# Patient Record
Sex: FEMALE | Race: BLACK OR AFRICAN AMERICAN | Employment: FULL TIME | URBAN - METROPOLITAN AREA
[De-identification: names, ages, dates, MRNs, and addresses within clinical notes are randomized per-mention and may not be internally consistent; named-entity substitution may affect disease eponyms.]

---

## 2017-10-12 ENCOUNTER — HOSPITAL ENCOUNTER (OUTPATIENT)
Dept: OTHER | Age: 37
Discharge: HOME | End: 2017-10-12
Attending: INTERNAL MEDICINE | Admitting: INTERNAL MEDICINE

## 2017-10-12 LAB — MAGNESIUM: 2 MG/DL (ref 1.8–2.4)

## 2017-11-16 ENCOUNTER — HOSPITAL ENCOUNTER (OUTPATIENT)
Dept: ENDOSCOPY | Age: 37
Discharge: HOME | End: 2017-11-16
Attending: INTERNAL MEDICINE | Admitting: INTERNAL MEDICINE

## 2017-11-16 LAB — HCG(URINE) PREGNANCY TEST: NEGATIVE

## 2017-11-16 NOTE — ANESTHESIA PRE-OP
2/25/16   Barbara Rodriguez, DAISY       Current medications:    Current Outpatient Prescriptions   Medication Sig Dispense Refill    ondansetron (ZOFRAN ODT) 4 MG disintegrating tablet Take 1-2 tablets by mouth every 12 hours as needed for Nausea May Sub regular tablet (non-ODT) if insurance does not cover ODT. 12 tablet 0    sucralfate (CARAFATE) 1 GM tablet Take 1 tablet by mouth 4 times daily 120 tablet 3    pantoprazole (PROTONIX) 40 MG tablet Take 40 mg by mouth daily      ondansetron (ZOFRAN ODT) 4 MG disintegrating tablet Take 1-2 tablets by mouth every 12 hours as needed for Nausea May Sub regular tablet (non-ODT) if insurance does not cover ODT. 12 tablet 0    diphenoxylate-atropine (LOMOTIL) 2.5-0.025 MG per tablet Take 1 tablet by mouth 4 times daily as needed for Diarrhea 12 tablet 0    lidocaine (LIDODERM) 5 % Place 1 patch onto the skin daily 12 hours on, 12 hours off. 10 patch 0    naproxen (NAPROSYN) 500 MG tablet Take 1 tablet by mouth 2 times daily for 20 doses 20 tablet 0    meclizine (ANTIVERT) 12.5 MG tablet Take 2 tablets by mouth 3 times daily as needed for Dizziness 20 tablet 0    loratadine-pseudoephedrine (CLARITIN-D 12 HOUR) 5-120 MG per extended release tablet Take 1 tablet by mouth 2 times daily 20 tablet 0    albuterol sulfate HFA (PROVENTIL HFA) 108 (90 BASE) MCG/ACT inhaler Inhale 2 puffs into the lungs every 4 hours as needed for Wheezing or Shortness of Breath (Space out to every 6 hours as symptoms improve) Space out to every 6 hours as symptoms improve. 1 Inhaler 0     No current facility-administered medications for this encounter. Allergies:     Allergies   Allergen Reactions    Cephalosporins Hives       Problem List:    Patient Active Problem List   Diagnosis Code    Perineal laceration of vagina S31.41XA    Quadriceps tendonitis M76.899       Past Medical History:        Diagnosis Date    Postpartum depression     Ulcer ([facility] [address]        Past Surgical History:

## 2017-11-16 NOTE — ANESTHESIA POST-OP
Anesthesia Post-op Note    Patient: Alex Licea  MRN: [de-identified]  YOB: 1980  Date of evaluation: 11/16/2017  Time:  9:03 AM     Procedure(s) Performed:     Last Vitals: There were no vitals taken for this visit.     Sterling Phase I:      Sterling Phase II:      Anesthesia Post Evaluation    Final anesthesia type: MAC and TIVA  Patient location during evaluation: PACU  Patient participation: complete - patient cannot participate  Pain score: 0  Airway patency: patent  Nausea & Vomiting: no vomiting and no nausea  Complications: no  Cardiovascular status: blood pressure returned to baseline  Respiratory status: acceptable  Hydration status: stable        Sylvia Lewis MD  9:03 AM

## 2017-12-12 ENCOUNTER — HOSPITAL ENCOUNTER (OUTPATIENT)
Dept: CT IMAGING | Age: 37
Discharge: HOME | End: 2017-12-12
Attending: INTERNAL MEDICINE | Admitting: INTERNAL MEDICINE

## 2017-12-12 DIAGNOSIS — R10.13 EPIGASTRIC PAIN: ICD-10-CM

## 2018-02-01 ENCOUNTER — HOSPITAL ENCOUNTER (OUTPATIENT)
Dept: ENDOSCOPY | Age: 38
Discharge: HOME | End: 2018-02-01
Attending: INTERNAL MEDICINE | Admitting: INTERNAL MEDICINE

## 2018-02-01 LAB — HCG(URINE) PREGNANCY TEST: NEGATIVE

## 2018-02-01 NOTE — ANESTHESIA PRE-OP
[facility] Anesthesiology  Pre-Anesthesia Evaluation/Consultation       Name:  Mary Bolanos                                         Age:  42 y.o. MRN:    [id number]           Procedure (Scheduled): COLONOSCOPY   Surgeon:     Dr. Lauretha Dubin, MD     Allergies   Allergen Reactions    Cephalosporins Hives     Patient Active Problem List   Diagnosis    Perineal laceration of vagina    Quadriceps tendonitis     Past Medical History:   Diagnosis Date    Postpartum depression     Ulcer [facility])      Past Surgical History:   Procedure Laterality Date    VAGINA SURGERY  3/28/2010    repair of vaginal tear     Social History   Substance Use Topics    Smoking status: Former Smoker     Packs/day: 0.50     Quit date: 1/18/2008    Smokeless tobacco: Not on file    Alcohol use Yes      Comment: 2 GLASSES OF WINE PER DAY       Prior to Admission medications    Not on File       No current outpatient prescriptions on file. No current facility-administered medications for this encounter. Vital Signs  (Current) There were no vitals filed for this visit.   (for past 48 hrs)  No Data Recorded  (last three values)   BP Readings from Last 3 Encounters:   09/14/17 126/78   06/25/17 114/75   06/08/17 117/67       CBC  Lab Results   Component Value Date    WBC 9.2 09/14/2017    RBC 4.20 09/14/2017    HGB 12.2 09/14/2017    HCT 36.6 09/14/2017    MCV 87.2 09/14/2017    RDW 12.9 09/14/2017     09/14/2017       CMP    Lab Results   Component Value Date     09/14/2017    K 3.5 09/14/2017     09/14/2017    CO2 21 09/14/2017    BUN 12 09/14/2017    CREATININE 0.6 09/14/2017    GFRAA >60 09/14/2017    GFRAA >60 04/14/2012    AGRATIO 1.2 06/25/2017    LABGLOM >60 09/14/2017    GLUCOSE 103 09/14/2017    PROT 7.3 09/14/2017    PROT 7.6 04/14/2012    CALCIUM 9.2 09/14/2017    BILITOT 0.3 09/14/2017    ALKPHOS 37 09/14/2017    AST 14 09/14/2017    ALT 11 09/14/2017       BMP    Lab Results   Component Value Date     09/14/2017    K 3.5 09/14/2017     09/14/2017    CO2 21 09/14/2017    BUN 12 09/14/2017    CREATININE 0.6 09/14/2017    CALCIUM 9.2 09/14/2017    GFRAA >60 09/14/2017    GFRAA >60 04/14/2012    LABGLOM >60 09/14/2017    GLUCOSE 103 09/14/2017       Coags   No results found for: PROTIME, INR, APTT    HCG (If Applicable)   Lab Results   Component Value Date    PREGTESTUR Negative 11/16/2017        ABGs  No results found for: PHART, PO2ART, PPD1QCX, KZS4UWY, BEART, T4QFFCGU     Type & Screen (If Applicable)  Lab Results   Component Value Date    LABABO A 03/28/2010    LABRH Positive 03/28/2010         POCGlucose  No results for input(s): GLUCOSE in the last 72 hours. NPO Status  > 8 hours                       BMI  There is no height or weight on file to calculate BMI. Estimated body mass index is 22.86 kg/m² as calculated from the following:    Height as of 1/18/18: 5' 2\" (1.575 m). Weight as of 1/18/18: 125 lb (56.7 kg). Additional Testing (Echo, Stress, ECG, PFTs, etc)        Anesthesia Evaluation  Patient summary reviewed and Nursing notes reviewed  Airway: Mallampati: II  TM distance: >3 FB   Neck ROM: full  Mouth opening: > = 3 FB Dental: normal exam         Pulmonary:normal exam        (-) COPD                           Cardiovascular:        (-) CAD    ECG reviewed  Rhythm: regular  Rate: normal           Beta Blocker:  Not on Beta Blocker         Neuro/Psych:      (-) CVA and psychiatric history           GI/Hepatic/Renal:             Endo/Other:                     Abdominal:           Vascular:                                        Anesthesia Plan      MAC     ASA 1     (Plan for MAC with standard ASA monitoring. Additional monitoring as dictated by intra-operative course. Patient appropriately NPO for the procedure. Risk/Benefits reviewed with patient and all anesthetic questions answered prior to procedure.  )  Induction: intravenous.       Anesthetic plan and risks

## 2019-01-16 ENCOUNTER — HOSPITAL ENCOUNTER (EMERGENCY)
Age: 39
Discharge: HOME | End: 2019-01-16
Payer: COMMERCIAL

## 2019-01-16 VITALS
HEART RATE: 88 BPM | DIASTOLIC BLOOD PRESSURE: 87 MMHG | RESPIRATION RATE: 18 BRPM | SYSTOLIC BLOOD PRESSURE: 135 MMHG | OXYGEN SATURATION: 100 % | TEMPERATURE: 97.7 F

## 2019-01-16 DIAGNOSIS — S46.811A TRAPEZIUS MUSCLE STRAIN, RIGHT, INITIAL ENCOUNTER: Primary | ICD-10-CM

## 2019-01-16 PROCEDURE — 99282 EMERGENCY DEPT VISIT SF MDM: CPT

## 2019-01-16 PROCEDURE — 6370000000 HC RX 637 (ALT 250 FOR IP): Performed by: PHYSICIAN ASSISTANT

## 2019-01-16 RX ORDER — HYDROCODONE BITARTRATE AND ACETAMINOPHEN 5; 325 MG/1; MG/1
1 TABLET ORAL EVERY 6 HOURS PRN
Qty: 12 TABLET | Refills: 0 | Status: SHIPPED | OUTPATIENT
Start: 2019-01-16 | End: 2019-01-19

## 2019-01-16 RX ORDER — IBUPROFEN 800 MG/1
800 TABLET ORAL ONCE
Status: COMPLETED | OUTPATIENT
Start: 2019-01-16 | End: 2019-01-16

## 2019-01-16 RX ORDER — LIDOCAINE 50 MG/G
1 PATCH TOPICAL DAILY
Qty: 5 PATCH | Refills: 0 | Status: SHIPPED | OUTPATIENT
Start: 2019-01-16 | End: 2019-02-15

## 2019-01-16 RX ORDER — LIDOCAINE 4 G/G
1 PATCH TOPICAL DAILY
Status: DISCONTINUED | OUTPATIENT
Start: 2019-01-16 | End: 2019-01-16 | Stop reason: HOSPADM

## 2019-01-16 RX ORDER — CYCLOBENZAPRINE HCL 10 MG
10 TABLET ORAL 3 TIMES DAILY PRN
Qty: 30 TABLET | Refills: 0 | Status: SHIPPED | OUTPATIENT
Start: 2019-01-16 | End: 2019-01-26

## 2019-01-16 RX ORDER — IBUPROFEN 800 MG/1
800 TABLET ORAL EVERY 8 HOURS PRN
Qty: 20 TABLET | Refills: 0 | Status: ON HOLD | OUTPATIENT
Start: 2019-01-16 | End: 2021-05-29 | Stop reason: SINTOL

## 2019-01-16 RX ADMIN — IBUPROFEN 800 MG: 800 TABLET, FILM COATED ORAL at 19:26

## 2019-01-16 ASSESSMENT — ENCOUNTER SYMPTOMS
SHORTNESS OF BREATH: 0
NAUSEA: 0
VOMITING: 0
COLOR CHANGE: 0
CONSTIPATION: 0
ABDOMINAL PAIN: 0
BACK PAIN: 1
DIARRHEA: 0

## 2019-01-16 ASSESSMENT — PAIN DESCRIPTION - PAIN TYPE: TYPE: ACUTE PAIN

## 2019-01-16 ASSESSMENT — PAIN SCALES - GENERAL
PAINLEVEL_OUTOF10: 10
PAINLEVEL_OUTOF10: 10

## 2019-01-21 ENCOUNTER — HOSPITAL ENCOUNTER (EMERGENCY)
Age: 39
Discharge: HOME | End: 2019-01-21
Attending: EMERGENCY MEDICINE
Payer: COMMERCIAL

## 2019-01-21 ENCOUNTER — APPOINTMENT (OUTPATIENT)
Dept: GENERAL RADIOLOGY | Age: 39
End: 2019-01-21
Payer: COMMERCIAL

## 2019-01-21 VITALS
SYSTOLIC BLOOD PRESSURE: 125 MMHG | OXYGEN SATURATION: 100 % | HEART RATE: 86 BPM | RESPIRATION RATE: 16 BRPM | DIASTOLIC BLOOD PRESSURE: 74 MMHG | TEMPERATURE: 97.2 F

## 2019-01-21 DIAGNOSIS — M54.6 PAIN IN THORACIC SPINE: Primary | ICD-10-CM

## 2019-01-21 PROCEDURE — 71046 X-RAY EXAM CHEST 2 VIEWS: CPT

## 2019-01-21 PROCEDURE — 99283 EMERGENCY DEPT VISIT LOW MDM: CPT

## 2019-01-21 PROCEDURE — 72040 X-RAY EXAM NECK SPINE 2-3 VW: CPT

## 2019-01-21 PROCEDURE — 6370000000 HC RX 637 (ALT 250 FOR IP): Performed by: EMERGENCY MEDICINE

## 2019-01-21 PROCEDURE — 96372 THER/PROPH/DIAG INJ SC/IM: CPT

## 2019-01-21 PROCEDURE — 6360000002 HC RX W HCPCS: Performed by: EMERGENCY MEDICINE

## 2019-01-21 RX ORDER — PREDNISONE 20 MG/1
60 TABLET ORAL ONCE
Status: COMPLETED | OUTPATIENT
Start: 2019-01-21 | End: 2019-01-21

## 2019-01-21 RX ORDER — METHOCARBAMOL 750 MG/1
750-1500 TABLET, FILM COATED ORAL 3 TIMES DAILY
Qty: 15 TABLET | Refills: 0 | Status: SHIPPED | OUTPATIENT
Start: 2019-01-21 | End: 2019-01-26

## 2019-01-21 RX ORDER — ORPHENADRINE CITRATE 30 MG/ML
60 INJECTION INTRAMUSCULAR; INTRAVENOUS ONCE
Status: COMPLETED | OUTPATIENT
Start: 2019-01-21 | End: 2019-01-21

## 2019-01-21 RX ORDER — PREDNISONE 20 MG/1
20 TABLET ORAL DAILY
Qty: 4 TABLET | Refills: 0 | Status: SHIPPED | OUTPATIENT
Start: 2019-01-21 | End: 2019-01-25

## 2019-01-21 RX ADMIN — ORPHENADRINE CITRATE 60 MG: 30 INJECTION INTRAMUSCULAR; INTRAVENOUS at 09:55

## 2019-01-21 RX ADMIN — PREDNISONE 60 MG: 20 TABLET ORAL at 09:55

## 2019-01-21 ASSESSMENT — PAIN DESCRIPTION - PAIN TYPE: TYPE: ACUTE PAIN

## 2019-01-21 ASSESSMENT — PAIN SCALES - GENERAL: PAINLEVEL_OUTOF10: 10

## 2020-02-26 ENCOUNTER — APPOINTMENT (OUTPATIENT)
Dept: GENERAL RADIOLOGY | Age: 40
End: 2020-02-26
Payer: COMMERCIAL

## 2020-02-26 ENCOUNTER — HOSPITAL ENCOUNTER (EMERGENCY)
Age: 40
Discharge: HOME | End: 2020-02-26
Attending: EMERGENCY MEDICINE
Payer: COMMERCIAL

## 2020-02-26 VITALS
TEMPERATURE: 97.6 F | DIASTOLIC BLOOD PRESSURE: 73 MMHG | BODY MASS INDEX: 21.9 KG/M2 | HEIGHT: 62 IN | WEIGHT: 119 LBS | RESPIRATION RATE: 16 BRPM | HEART RATE: 77 BPM | SYSTOLIC BLOOD PRESSURE: 108 MMHG | OXYGEN SATURATION: 98 %

## 2020-02-26 LAB
ANION GAP SERPL CALCULATED.3IONS-SCNC: 12 MMOL/L (ref 3–16)
BASOPHILS ABSOLUTE: 0.1 K/UL (ref 0–0.2)
BASOPHILS RELATIVE PERCENT: 0.9 %
BUN BLDV-MCNC: 12 MG/DL (ref 7–20)
CALCIUM SERPL-MCNC: 9.5 MG/DL (ref 8.3–10.6)
CHLORIDE BLD-SCNC: 104 MMOL/L (ref 99–110)
CO2: 22 MMOL/L (ref 21–32)
CREAT SERPL-MCNC: 0.6 MG/DL (ref 0.6–1.1)
EOSINOPHILS ABSOLUTE: 0.1 K/UL (ref 0–0.6)
EOSINOPHILS RELATIVE PERCENT: 1.1 %
GFR AFRICAN AMERICAN: >60
GFR NON-AFRICAN AMERICAN: >60
GLUCOSE BLD-MCNC: 116 MG/DL (ref 70–99)
HCG QUALITATIVE: NEGATIVE
HCT VFR BLD CALC: 39.2 % (ref 36–48)
HEMOGLOBIN: 13.5 G/DL (ref 12–16)
LYMPHOCYTES ABSOLUTE: 2 K/UL (ref 1–5.1)
LYMPHOCYTES RELATIVE PERCENT: 32.1 %
MCH RBC QN AUTO: 30.5 PG (ref 26–34)
MCHC RBC AUTO-ENTMCNC: 34.3 G/DL (ref 31–36)
MCV RBC AUTO: 88.8 FL (ref 80–100)
MONOCYTES ABSOLUTE: 0.5 K/UL (ref 0–1.3)
MONOCYTES RELATIVE PERCENT: 8.7 %
NEUTROPHILS ABSOLUTE: 3.6 K/UL (ref 1.7–7.7)
NEUTROPHILS RELATIVE PERCENT: 57.2 %
PDW BLD-RTO: 13.4 % (ref 12.4–15.4)
PLATELET # BLD: 280 K/UL (ref 135–450)
PMV BLD AUTO: 8.2 FL (ref 5–10.5)
POTASSIUM SERPL-SCNC: 3.8 MMOL/L (ref 3.5–5.1)
RBC # BLD: 4.42 M/UL (ref 4–5.2)
SODIUM BLD-SCNC: 138 MMOL/L (ref 136–145)
TROPONIN: <0.01 NG/ML
WBC # BLD: 6.3 K/UL (ref 4–11)

## 2020-02-26 PROCEDURE — 96374 THER/PROPH/DIAG INJ IV PUSH: CPT

## 2020-02-26 PROCEDURE — 99284 EMERGENCY DEPT VISIT MOD MDM: CPT

## 2020-02-26 PROCEDURE — 71046 X-RAY EXAM CHEST 2 VIEWS: CPT

## 2020-02-26 PROCEDURE — 6360000002 HC RX W HCPCS: Performed by: PHYSICIAN ASSISTANT

## 2020-02-26 PROCEDURE — 85025 COMPLETE CBC W/AUTO DIFF WBC: CPT

## 2020-02-26 PROCEDURE — 2580000003 HC RX 258: Performed by: PHYSICIAN ASSISTANT

## 2020-02-26 PROCEDURE — 84703 CHORIONIC GONADOTROPIN ASSAY: CPT

## 2020-02-26 PROCEDURE — 96375 TX/PRO/DX INJ NEW DRUG ADDON: CPT

## 2020-02-26 PROCEDURE — 80048 BASIC METABOLIC PNL TOTAL CA: CPT

## 2020-02-26 PROCEDURE — 84484 ASSAY OF TROPONIN QUANT: CPT

## 2020-02-26 PROCEDURE — 93005 ELECTROCARDIOGRAM TRACING: CPT | Performed by: EMERGENCY MEDICINE

## 2020-02-26 RX ORDER — HYDROXYZINE PAMOATE 25 MG/1
25 CAPSULE ORAL 3 TIMES DAILY PRN
Qty: 20 CAPSULE | Refills: 0 | Status: SHIPPED | OUTPATIENT
Start: 2020-02-26

## 2020-02-26 RX ORDER — DIPHENHYDRAMINE HYDROCHLORIDE 50 MG/ML
25 INJECTION INTRAMUSCULAR; INTRAVENOUS ONCE
Status: COMPLETED | OUTPATIENT
Start: 2020-02-26 | End: 2020-02-26

## 2020-02-26 RX ORDER — NAPROXEN 500 MG/1
500 TABLET ORAL 2 TIMES DAILY
Qty: 20 TABLET | Refills: 0 | Status: ON HOLD | OUTPATIENT
Start: 2020-02-26 | End: 2021-05-29

## 2020-02-26 RX ORDER — 0.9 % SODIUM CHLORIDE 0.9 %
1000 INTRAVENOUS SOLUTION INTRAVENOUS ONCE
Status: COMPLETED | OUTPATIENT
Start: 2020-02-26 | End: 2020-02-26

## 2020-02-26 RX ORDER — PROMETHAZINE HYDROCHLORIDE 25 MG/1
12.5-25 TABLET ORAL EVERY 6 HOURS PRN
Qty: 12 TABLET | Refills: 0 | Status: SHIPPED | OUTPATIENT
Start: 2020-02-26 | End: 2020-03-04

## 2020-02-26 RX ORDER — METOCLOPRAMIDE HYDROCHLORIDE 5 MG/ML
10 INJECTION INTRAMUSCULAR; INTRAVENOUS ONCE
Status: COMPLETED | OUTPATIENT
Start: 2020-02-26 | End: 2020-02-26

## 2020-02-26 RX ORDER — ESCITALOPRAM OXALATE 20 MG/1
TABLET ORAL
COMMUNITY
Start: 2020-02-12

## 2020-02-26 RX ORDER — KETOROLAC TROMETHAMINE 30 MG/ML
15 INJECTION, SOLUTION INTRAMUSCULAR; INTRAVENOUS ONCE
Status: COMPLETED | OUTPATIENT
Start: 2020-02-26 | End: 2020-02-26

## 2020-02-26 RX ADMIN — KETOROLAC TROMETHAMINE 15 MG: 30 INJECTION, SOLUTION INTRAMUSCULAR; INTRAVENOUS at 10:34

## 2020-02-26 RX ADMIN — METOCLOPRAMIDE 10 MG: 5 INJECTION, SOLUTION INTRAMUSCULAR; INTRAVENOUS at 10:34

## 2020-02-26 RX ADMIN — DIPHENHYDRAMINE HYDROCHLORIDE 25 MG: 50 INJECTION, SOLUTION INTRAMUSCULAR; INTRAVENOUS at 10:34

## 2020-02-26 RX ADMIN — SODIUM CHLORIDE 1000 ML: 9 INJECTION, SOLUTION INTRAVENOUS at 10:34

## 2020-02-26 ASSESSMENT — ENCOUNTER SYMPTOMS
WHEEZING: 0
BACK PAIN: 0
ABDOMINAL PAIN: 0
NAUSEA: 1
VOMITING: 0
EYE ITCHING: 0
SHORTNESS OF BREATH: 1
COUGH: 0
CONSTIPATION: 0
ABDOMINAL DISTENTION: 0
DIARRHEA: 0
EYE DISCHARGE: 0
COLOR CHANGE: 0
STRIDOR: 0
EYE PAIN: 0
EYE REDNESS: 0
PHOTOPHOBIA: 1

## 2020-02-26 ASSESSMENT — PAIN SCALES - GENERAL
PAINLEVEL_OUTOF10: 8
PAINLEVEL_OUTOF10: 9

## 2020-02-26 NOTE — ED PROVIDER NOTES
[facility]        Pt Name: Armen Cintron  MRN: [de-identified]  [place] 1980  Date of evaluation: 2/26/2020  Provider: Jatinder Stanley PA-C  PCP: AMY Dean - CNP    This patient was seen and evaluated by the attending physician Dr Tonia Sidhu       Chief Complaint   Patient presents with    Headache     past 3-4 days- no history of such- also with c/o sob and chest pain        HISTORY OF PRESENT ILLNESS   (Location, Timing/Onset, Context/Setting, Quality, Duration, Modifying Factors, Severity, Associated Signs and Symptoms)  Note limiting factors. Armen Cintron is a 37 y.o. female who presents to the emergency department with complaints of right-sided parietal headache on and off for 2 to 3 days. She also reports that since yesterday, she has had intermittent chest discomfort and shortness of breath, which she believes is likely related to anxiety. She does have history of anxiety and states that recently she found out her boyfriend was . She has not had much of an appetite since finding this out. Typically when she does not eat or drink much, she does get a mild headache but states that she does not get them regularly. She rates her headache to be a 9 out of 10 on pain scale. Denies any acute visual disturbances but does have photophobia. She denies chest pain or shortness of breath at this time. She does become very tearful but denies hallucinations, suicidal or homicidal ideation, illicit drug use or alcohol abuse. Nursing Notes were all reviewed and agreed with or any disagreements were addressed in the HPI. REVIEW OF SYSTEMS    (2-9 systems for level 4, 10 or more for level 5)     Review of Systems   Constitutional: Negative for chills and fever. HENT: Negative. Eyes: Positive for photophobia. Negative for pain, discharge, redness, itching and visual disturbance. Respiratory: Positive for shortness of breath. Negative for cough, wheezing and stridor. Cardiovascular: Positive for chest pain. Negative for palpitations and leg swelling. Gastrointestinal: Positive for nausea. Negative for abdominal distention, abdominal pain, constipation, diarrhea and vomiting. Endocrine: Negative. Genitourinary: Negative. Musculoskeletal: Negative for back pain, neck pain and neck stiffness. Skin: Negative for color change, pallor, rash and wound. Neurological: Positive for light-headedness and headaches. Negative for dizziness, tremors, seizures, syncope, facial asymmetry, speech difficulty, weakness and numbness. Psychiatric/Behavioral: Positive for sleep disturbance. Negative for confusion, self-injury and suicidal ideas. The patient is nervous/anxious. All other systems reviewed and are negative. Positives and Pertinent negatives as per HPI. Except as noted above in the ROS, all other systems were reviewed and negative.        PAST MEDICAL HISTORY     Past Medical History:   Diagnosis Date    Postpartum depression     Ulcer          SURGICAL HISTORY     Past Surgical History:   Procedure Laterality Date    VAGINA SURGERY  3/28/2010    repair of vaginal tear         CURRENTMEDICATIONS       Previous Medications    ESCITALOPRAM (LEXAPRO) 20 MG TABLET        FAMOTIDINE (PEPCID) 20 MG TABLET    Take 1 tablet by mouth 2 times daily for 5 days    IBUPROFEN (ADVIL;MOTRIN) 800 MG TABLET    Take 1 tablet by mouth every 8 hours as needed for Pain         ALLERGIES     Cephalosporins    FAMILYHISTORY       Family History   Problem Relation Age of Onset    Arthritis Mother     Arthritis Father     Asthma Father     Diabetes Father     High Blood Pressure Father     Cancer Maternal Aunt           SOCIAL HISTORY       Social History     Tobacco Use    Smoking status: Former Smoker     Packs/day: 0.50     Last attempt to quit: 1/18/2008     Years since quitting: 12.1    Smokeless tobacco: Never Used   Substance Use Topics    Alcohol use: Yes     Comment: 2 GLASSES OF WINE PER DAY    Drug use: No       SCREENINGS             PHYSICAL EXAM    (up to 7 for level 4, 8 or more for level 5)     ED Triage Vitals [20 0928]   BP Temp Temp Source Pulse Resp SpO2 Height Weight   114/75 97.6 °F (36.4 °C) Oral 89 12 99 % 5' 2\" (1.575 m) 119 lb (54 kg)       Physical Exam  Vitals signs and nursing note reviewed. Constitutional:       Appearance: Normal appearance. She is well-developed. She is not toxic-appearing or diaphoretic. HENT:      Head: Normocephalic and atraumatic. Jaw: There is normal jaw occlusion. Salivary Glands: Right salivary gland is not diffusely enlarged or tender. Left salivary gland is not diffusely enlarged or tender. Comments: No temporal tenderness or pulsatile mass     Right Ear: Hearing, tympanic membrane, ear canal and external ear normal.      Left Ear: Hearing, tympanic membrane, ear canal and external ear normal.      Nose: Nose normal.      Right Sinus: No maxillary sinus tenderness or frontal sinus tenderness. Left Sinus: No maxillary sinus tenderness or frontal sinus tenderness. Mouth/Throat:      Lips: Pink. Mouth: Mucous membranes are moist.      Dentition: No dental tenderness. Tongue: No lesions. Tongue does not protrude in midline. Palate: No mass and lesions. Palate does not elevate in midline. Pharynx: Oropharynx is clear. Uvula midline. Tonsils: No tonsillar exudate or tonsillar abscesses. Swellin+ on the right. 1+ on the left. Eyes:      General: No scleral icterus. Right eye: No discharge. Left eye: No discharge. Extraocular Movements: Extraocular movements intact. Conjunctiva/sclera: Conjunctivae normal.      Pupils: Pupils are equal, round, and reactive to light. Neck:      Musculoskeletal: Normal range of motion.    Cardiovascular:      Rate and Rhythm: Normal rate and regular rhythm. Heart sounds: Normal heart sounds. Pulmonary:      Effort: Pulmonary effort is normal.      Breath sounds: Normal breath sounds. Abdominal:      General: Bowel sounds are normal.      Palpations: Abdomen is soft. Tenderness: There is no abdominal tenderness. Musculoskeletal: Normal range of motion. Comments: No extremity edema, posterior calf or thigh tenderness, palpable cord, discoloration. Negative homans. Skin:     General: Skin is warm and dry. Capillary Refill: Capillary refill takes less than 2 seconds. Coloration: Skin is not jaundiced or pale. Findings: No bruising, erythema, lesion or rash. Neurological:      General: No focal deficit present. Mental Status: She is alert and oriented to person, place, and time. Cranial Nerves: No cranial nerve deficit. Sensory: No sensory deficit. Motor: No weakness. Coordination: Coordination normal.      Gait: Gait normal.      Deep Tendon Reflexes: Reflexes normal.      Comments: No pronator drift, facial droop or slurred speech. Normal finger to nose coordination. Normal rapid alternating hand movement. Normal heel to shin coordination   Andrey Hallpike negative without nystagmus. 5 out of 5 strength in all 4 extremities without focal weakness, paresthesia or radiculopathy. Psychiatric:         Attention and Perception: Attention and perception normal.         Mood and Affect: Mood is anxious and depressed. Speech: Speech normal.         Behavior: Behavior normal. Behavior is cooperative. Thought Content:  Thought content normal.         Cognition and Memory: Cognition and memory normal.         Judgment: Judgment normal.         DIAGNOSTIC RESULTS   LABS:    Labs Reviewed   BASIC METABOLIC PANEL - Abnormal; Notable for the following components:       Result Value    Glucose 116 (*)     All other components within normal limits    Narrative: Performed at:  [facility]  [address], [address]   Phone [phone]   TROPONIN    Narrative:     Performed at:  [facility]  [address], [address]   Phone [phone]   CBC WITH AUTO DIFFERENTIAL    Narrative:     Performed at:  [facility] [address] [facility],  [place], [address]   Phone [phone]   HCG, SERUM, QUALITATIVE    Narrative:     Performed at:  [facility] [address],  [place], [address]   Phone [phone]       All other labs were within normal range or not returned as of this dictation. EKG: All EKG's are interpreted by the Emergency Department Physician in the absence of a cardiologist.  Please see their note for interpretation of EKG. RADIOLOGY:   Non-plain film images such as CT, Ultrasound and MRI are read by the radiologist. Plain radiographic images are visualized and preliminarily interpreted by the ED Provider with the below findings:        Interpretation per the Radiologist below, if available at the time of this note:    XR CHEST STANDARD (2 VW)   Final Result   1. No acute abnormality. Xr Chest Standard (2 Vw)    Result Date: 2/26/2020  EXAMINATION: TWO XRAY VIEWS OF THE CHEST 2/26/2020 9:37 am COMPARISON: 01/21/2019 HISTORY: ORDERING SYSTEM PROVIDED HISTORY: cp TECHNOLOGIST PROVIDED HISTORY: Reason for exam:->cp Reason for Exam: Headache (past 3-4 days- no history of such- also with c/o sob and chest pain ) Acuity: Acute Type of Exam: Initial FINDINGS: The lungs are clear. The cardiac silhouette is within normal limits. There is no pneumothorax or pleural effusion. 1.  No acute abnormality.            PROCEDURES   Unless otherwise noted below, none     Procedures    CRITICAL CARE TIME   N/A    CONSULTS:  None      EMERGENCY DEPARTMENT COURSE and DIFFERENTIAL DIAGNOSIS/MDM:   Vitals: leukemia, asthma exacerbation, osteomyelitis, mastoiditis, sepsis, DKA, acute surgical abdomen, obstruction, perforation, abscess, mesenteric ischemia, AAA, dissection, cholecystitis, cholangitis, pancreatitis, appendicitis, C. diff colitis, diverticulitis, volvulus, incarcerated hernia, necrotizing fasciitis, TOA, ovarian torsion, PID, ectopic pregnancy, viviane jason Gregory syndrome, , pyelonephritis, perinephric abscess, kidney stone, urosepsis, fistula or other concerning pathology. Vitals are stable here, and patient is stable for discharge. We have addressed concerns and expectations. She denies suicidal or homicidal ideation or hallucinations. Denies illicit drug use or alcohol abuse. Suspicion is low for delirium tremens or overdose. FINAL IMPRESSION      1. Acute nonintractable headache, unspecified headache type    2. Chest discomfort    3. Shortness of breath    4.  Anxiety attack          DISPOSITION/PLAN   DISPOSITION Decision To Discharge 02/26/2020 10:52:21 AM      PATIENT REFERREDTO:  AMY Mcgill CNP  [address] [place] [address]  [phone]      for follow up in 1-3 days    [facility] Emergency Department  [address] [facility]  [phone]    If symptoms worsen      DISCHARGE MEDICATIONS:  New Prescriptions    HYDROXYZINE (VISTARIL) 25 MG CAPSULE    Take 1 capsule by mouth 3 times daily as needed for Anxiety    NAPROXEN (NAPROSYN) 500 MG TABLET    Take 1 tablet by mouth 2 times daily for 20 doses    PROMETHAZINE (PHENERGAN) 25 MG TABLET    Take 0.5-1 tablets by mouth every 6 hours as needed for Nausea       DISCONTINUED MEDICATIONS:  Discontinued Medications    No medications on file              (Please note that portions of this note were completed with a voice recognition program.  Efforts were made to edit the dictations but occasionally words are mis-transcribed.)    Mehdi Suarez PA-C (electronically signed) Lucinda Rich PA-C  02/26/20 1212

## 2020-02-26 NOTE — ED PROVIDER NOTES
This patient was seen by the Mid-Level Provider. I have seen and examined the patient, agree with the workup, evaluation, management and diagnosis. Care plan has been discussed. My assessment reveals a 58-year-old female presents with multiple complaints. This is a 58-year-old female who states she is currently going through a separation from a boyfriend she found out was recently . She presents today with a history of some chest pain or shortness of breath over the last 3 or 4 days. Patient's work-up today was unremarkable normal including a cardiac work-up and a chest x-ray. The patient was discharged with referral back to her primary care physician with instructed to return if worse. The patient is at low risk for cardiac disease. EKG: Sinus rhythm at a rate of 88 beats a minute with no acute ST elevations or depressions or pathologic Q waves. Normal axis. Exam: Well-nourished female no acute distress. Her chest was clear to auscultation bilaterally. Heart was regular rate rhythm with no murmurs rubs gallops. MDM: 58-year-old female presents with some chest pain or shortness of breath. Her work-up was unremarkable. She scores low on her heart score. This appears to be secondary to some stress going on with her private life. She was discharged with referral back to her primary care physician with instructed to return if worse.     Results for orders placed or performed during the hospital encounter of 03/01/51   Basic Metabolic Panel   Result Value Ref Range    Sodium 138 136 - 145 mmol/L    Potassium 3.8 3.5 - 5.1 mmol/L    Chloride 104 99 - 110 mmol/L    CO2 22 21 - 32 mmol/L    Anion Gap 12 3 - 16    Glucose 116 (H) 70 - 99 mg/dL    BUN 12 7 - 20 mg/dL    CREATININE 0.6 0.6 - 1.1 mg/dL    GFR Non-African American >60 >60    GFR African American >60 >60    Calcium 9.5 8.3 - 10.6 mg/dL   Troponin   Result Value Ref Range    Troponin <0.01 <0.01 ng/mL   CBC Auto Differential   Result Value Ref Range    WBC 6.3 4.0 - 11.0 K/uL    RBC 4.42 4.00 - 5.20 M/uL    Hemoglobin 13.5 12.0 - 16.0 g/dL    Hematocrit 39.2 36.0 - 48.0 %    MCV 88.8 80.0 - 100.0 fL    MCH 30.5 26.0 - 34.0 pg    MCHC 34.3 31.0 - 36.0 g/dL    RDW 13.4 12.4 - 15.4 %    Platelets 356 327 - 359 K/uL    MPV 8.2 5.0 - 10.5 fL    Neutrophils % 57.2 %    Lymphocytes % 32.1 %    Monocytes % 8.7 %    Eosinophils % 1.1 %    Basophils % 0.9 %    Neutrophils Absolute 3.6 1.7 - 7.7 K/uL    Lymphocytes Absolute 2.0 1.0 - 5.1 K/uL    Monocytes Absolute 0.5 0.0 - 1.3 K/uL    Eosinophils Absolute 0.1 0.0 - 0.6 K/uL    Basophils Absolute 0.1 0.0 - 0.2 K/uL   HCG Qualitative, Serum   Result Value Ref Range    hCG Qual Negative Detects HCG level >10 MIU/mL   EKG 12 Lead   Result Value Ref Range    Ventricular Rate 88 BPM    Atrial Rate 88 BPM    P-R Interval 132 ms    QRS Duration 72 ms    Q-T Interval 370 ms    QTc Calculation (Bazett) 447 ms    P Axis 79 degrees    R Axis 69 degrees    T Axis 63 degrees    Diagnosis Normal sinus rhythm      Xr Chest Standard (2 Vw)    Result Date: 2/26/2020  EXAMINATION: TWO XRAY VIEWS OF THE CHEST 2/26/2020 9:37 am COMPARISON: 01/21/2019 HISTORY: ORDERING SYSTEM PROVIDED HISTORY: cp TECHNOLOGIST PROVIDED HISTORY: Reason for exam:->cp Reason for Exam: Headache (past 3-4 days- no history of such- also with c/o sob and chest pain ) Acuity: Acute Type of Exam: Initial FINDINGS: The lungs are clear. The cardiac silhouette is within normal limits. There is no pneumothorax or pleural effusion. 1.  No acute abnormality.         Christopher Ho MD  02/26/20 2442

## 2020-02-26 NOTE — LETTER
[facility] Emergency Department  [address] [address]  Phone: [phone]               February 26, 2020    Patient: Fabrizio Romero   YOB: 1980   Date of Visit: 2/26/2020       To Whom It May Concern:    Fabrizio Romero was seen and treated in our emergency department on 2/26/2020. She may return to work on 2/27/20 without restriction. Sincerely, SARAH VALENZUELA               Signature:__________________________________

## 2020-02-27 LAB
EKG ATRIAL RATE: 88 BPM
EKG DIAGNOSIS: NORMAL
EKG P AXIS: 79 DEGREES
EKG P-R INTERVAL: 132 MS
EKG Q-T INTERVAL: 370 MS
EKG QRS DURATION: 72 MS
EKG QTC CALCULATION (BAZETT): 447 MS
EKG R AXIS: 69 DEGREES
EKG T AXIS: 63 DEGREES
EKG VENTRICULAR RATE: 88 BPM

## 2020-02-27 PROCEDURE — 93010 ELECTROCARDIOGRAM REPORT: CPT | Performed by: INTERNAL MEDICINE

## 2021-05-29 ENCOUNTER — APPOINTMENT (OUTPATIENT)
Dept: CT IMAGING | Age: 41
DRG: 660 | End: 2021-05-29
Payer: COMMERCIAL

## 2021-05-29 ENCOUNTER — APPOINTMENT (OUTPATIENT)
Dept: GENERAL RADIOLOGY | Age: 41
DRG: 660 | End: 2021-05-29
Payer: COMMERCIAL

## 2021-05-29 ENCOUNTER — HOSPITAL ENCOUNTER (INPATIENT)
Age: 41
LOS: 3 days | Discharge: HOME | DRG: 660 | End: 2021-06-01
Attending: FAMILY MEDICINE | Admitting: FAMILY MEDICINE
Payer: COMMERCIAL

## 2021-05-29 DIAGNOSIS — F41.8 ANXIETY ABOUT HEALTH: ICD-10-CM

## 2021-05-29 DIAGNOSIS — D61.818 PANCYTOPENIA (HCC): Primary | ICD-10-CM

## 2021-05-29 DIAGNOSIS — R07.9 CHEST PAIN, UNSPECIFIED TYPE: ICD-10-CM

## 2021-05-29 DIAGNOSIS — D64.9 SYMPTOMATIC ANEMIA: ICD-10-CM

## 2021-05-29 DIAGNOSIS — E87.6 HYPOKALEMIA: ICD-10-CM

## 2021-05-29 DIAGNOSIS — R06.00 DYSPNEA, UNSPECIFIED TYPE: ICD-10-CM

## 2021-05-29 LAB
ABO/RH: NORMAL
ALBUMIN SERPL-MCNC: 3.8 G/DL (ref 3.4–5)
ALP BLD-CCNC: 34 U/L (ref 40–129)
ALT SERPL-CCNC: 7 U/L (ref 10–40)
ANION GAP SERPL CALCULATED.3IONS-SCNC: 11 MMOL/L (ref 3–16)
ANTIBODY SCREEN: NORMAL
AST SERPL-CCNC: 11 U/L (ref 15–37)
BILIRUB SERPL-MCNC: 0.4 MG/DL (ref 0–1)
BILIRUBIN DIRECT: <0.2 MG/DL (ref 0–0.3)
BILIRUBIN, INDIRECT: ABNORMAL MG/DL (ref 0–1)
BLOOD BANK DISPENSE STATUS: NORMAL
BLOOD BANK PRODUCT CODE: NORMAL
BPU ID: NORMAL
BUN BLDV-MCNC: 9 MG/DL (ref 7–20)
CALCIUM SERPL-MCNC: 8 MG/DL (ref 8.3–10.6)
CHLORIDE BLD-SCNC: 110 MMOL/L (ref 99–110)
CO2: 19 MMOL/L (ref 21–32)
CREAT SERPL-MCNC: 0.5 MG/DL (ref 0.6–1.1)
DESCRIPTION BLOOD BANK: NORMAL
FERRITIN: 138.8 NG/ML (ref 15–150)
GFR AFRICAN AMERICAN: >60
GFR NON-AFRICAN AMERICAN: >60
GLUCOSE BLD-MCNC: 92 MG/DL (ref 70–99)
HCG QUALITATIVE: NEGATIVE
IRON SATURATION: 46 % (ref 15–50)
IRON: 133 UG/DL (ref 37–145)
OCCULT BLOOD DIAGNOSTIC: NORMAL
POTASSIUM SERPL-SCNC: 3.2 MMOL/L (ref 3.5–5.1)
PRO-BNP: 39 PG/ML (ref 0–124)
SARS-COV-2, NAAT: NOT DETECTED
SODIUM BLD-SCNC: 140 MMOL/L (ref 136–145)
TOTAL IRON BINDING CAPACITY: 289 UG/DL (ref 260–445)
TOTAL PROTEIN: 6.3 G/DL (ref 6.4–8.2)
TROPONIN: <0.01 NG/ML

## 2021-05-29 PROCEDURE — G0328 FECAL BLOOD SCRN IMMUNOASSAY: HCPCS

## 2021-05-29 PROCEDURE — 83540 ASSAY OF IRON: CPT

## 2021-05-29 PROCEDURE — 84703 CHORIONIC GONADOTROPIN ASSAY: CPT

## 2021-05-29 PROCEDURE — 6360000002 HC RX W HCPCS: Performed by: PHYSICIAN ASSISTANT

## 2021-05-29 PROCEDURE — 83550 IRON BINDING TEST: CPT

## 2021-05-29 PROCEDURE — 82728 ASSAY OF FERRITIN: CPT

## 2021-05-29 PROCEDURE — 2580000003 HC RX 258: Performed by: PHYSICIAN ASSISTANT

## 2021-05-29 PROCEDURE — 1200000000 HC SEMI PRIVATE

## 2021-05-29 PROCEDURE — 2580000003 HC RX 258: Performed by: FAMILY MEDICINE

## 2021-05-29 PROCEDURE — 80076 HEPATIC FUNCTION PANEL: CPT

## 2021-05-29 PROCEDURE — 80048 BASIC METABOLIC PNL TOTAL CA: CPT

## 2021-05-29 PROCEDURE — 96374 THER/PROPH/DIAG INJ IV PUSH: CPT

## 2021-05-29 PROCEDURE — U0003 INFECTIOUS AGENT DETECTION BY NUCLEIC ACID (DNA OR RNA); SEVERE ACUTE RESPIRATORY SYNDROME CORONAVIRUS 2 (SARS-COV-2) (CORONAVIRUS DISEASE [COVID-19]), AMPLIFIED PROBE TECHNIQUE, MAKING USE OF HIGH THROUGHPUT TECHNOLOGIES AS DESCRIBED BY CMS-2020-01-R: HCPCS

## 2021-05-29 PROCEDURE — 71260 CT THORAX DX C+: CPT

## 2021-05-29 PROCEDURE — 86900 BLOOD TYPING SEROLOGIC ABO: CPT

## 2021-05-29 PROCEDURE — 6360000002 HC RX W HCPCS: Performed by: FAMILY MEDICINE

## 2021-05-29 PROCEDURE — 74177 CT ABD & PELVIS W/CONTRAST: CPT

## 2021-05-29 PROCEDURE — 71046 X-RAY EXAM CHEST 2 VIEWS: CPT

## 2021-05-29 PROCEDURE — 83880 ASSAY OF NATRIURETIC PEPTIDE: CPT

## 2021-05-29 PROCEDURE — 87635 SARS-COV-2 COVID-19 AMP PRB: CPT

## 2021-05-29 PROCEDURE — 6370000000 HC RX 637 (ALT 250 FOR IP): Performed by: PHYSICIAN ASSISTANT

## 2021-05-29 PROCEDURE — U0005 INFEC AGEN DETEC AMPLI PROBE: HCPCS

## 2021-05-29 PROCEDURE — 85025 COMPLETE CBC W/AUTO DIFF WBC: CPT

## 2021-05-29 PROCEDURE — 86850 RBC ANTIBODY SCREEN: CPT

## 2021-05-29 PROCEDURE — 96375 TX/PRO/DX INJ NEW DRUG ADDON: CPT

## 2021-05-29 PROCEDURE — 86923 COMPATIBILITY TEST ELECTRIC: CPT

## 2021-05-29 PROCEDURE — 36430 TRANSFUSION BLD/BLD COMPNT: CPT

## 2021-05-29 PROCEDURE — P9016 RBC LEUKOCYTES REDUCED: HCPCS

## 2021-05-29 PROCEDURE — 84484 ASSAY OF TROPONIN QUANT: CPT

## 2021-05-29 PROCEDURE — 36415 COLL VENOUS BLD VENIPUNCTURE: CPT

## 2021-05-29 PROCEDURE — 93005 ELECTROCARDIOGRAM TRACING: CPT | Performed by: STUDENT IN AN ORGANIZED HEALTH CARE EDUCATION/TRAINING PROGRAM

## 2021-05-29 PROCEDURE — 99285 EMERGENCY DEPT VISIT HI MDM: CPT

## 2021-05-29 PROCEDURE — 86901 BLOOD TYPING SEROLOGIC RH(D): CPT

## 2021-05-29 PROCEDURE — 6360000004 HC RX CONTRAST MEDICATION: Performed by: PHYSICIAN ASSISTANT

## 2021-05-29 RX ORDER — CETIRIZINE HYDROCHLORIDE 10 MG/1
10 TABLET ORAL DAILY
COMMUNITY

## 2021-05-29 RX ORDER — SODIUM CHLORIDE 9 MG/ML
INJECTION, SOLUTION INTRAVENOUS CONTINUOUS
Status: DISCONTINUED | OUTPATIENT
Start: 2021-05-29 | End: 2021-05-30

## 2021-05-29 RX ORDER — ONDANSETRON 2 MG/ML
4 INJECTION INTRAMUSCULAR; INTRAVENOUS EVERY 6 HOURS PRN
Status: DISCONTINUED | OUTPATIENT
Start: 2021-05-29 | End: 2021-06-01 | Stop reason: HOSPADM

## 2021-05-29 RX ORDER — SODIUM CHLORIDE 9 MG/ML
25 INJECTION, SOLUTION INTRAVENOUS PRN
Status: DISCONTINUED | OUTPATIENT
Start: 2021-05-29 | End: 2021-06-01 | Stop reason: HOSPADM

## 2021-05-29 RX ORDER — SODIUM CHLORIDE 9 MG/ML
INJECTION, SOLUTION INTRAVENOUS PRN
Status: DISCONTINUED | OUTPATIENT
Start: 2021-05-29 | End: 2021-06-01 | Stop reason: HOSPADM

## 2021-05-29 RX ORDER — LORAZEPAM 2 MG/ML
1 INJECTION INTRAMUSCULAR ONCE
Status: COMPLETED | OUTPATIENT
Start: 2021-05-29 | End: 2021-05-29

## 2021-05-29 RX ORDER — POTASSIUM CHLORIDE 20 MEQ/1
40 TABLET, EXTENDED RELEASE ORAL PRN
Status: DISCONTINUED | OUTPATIENT
Start: 2021-05-29 | End: 2021-06-01 | Stop reason: HOSPADM

## 2021-05-29 RX ORDER — FAMOTIDINE 20 MG/1
20 TABLET, FILM COATED ORAL 2 TIMES DAILY
Status: DISCONTINUED | OUTPATIENT
Start: 2021-05-29 | End: 2021-05-30

## 2021-05-29 RX ORDER — POLYETHYLENE GLYCOL 3350 17 G/17G
17 POWDER, FOR SOLUTION ORAL DAILY PRN
Status: DISCONTINUED | OUTPATIENT
Start: 2021-05-29 | End: 2021-06-01 | Stop reason: HOSPADM

## 2021-05-29 RX ORDER — PROMETHAZINE HYDROCHLORIDE 25 MG/1
12.5 TABLET ORAL EVERY 6 HOURS PRN
Status: DISCONTINUED | OUTPATIENT
Start: 2021-05-29 | End: 2021-06-01 | Stop reason: HOSPADM

## 2021-05-29 RX ORDER — MORPHINE SULFATE 4 MG/ML
4 INJECTION, SOLUTION INTRAMUSCULAR; INTRAVENOUS ONCE
Status: COMPLETED | OUTPATIENT
Start: 2021-05-29 | End: 2021-05-29

## 2021-05-29 RX ORDER — SODIUM CHLORIDE 0.9 % (FLUSH) 0.9 %
5-40 SYRINGE (ML) INJECTION EVERY 12 HOURS SCHEDULED
Status: DISCONTINUED | OUTPATIENT
Start: 2021-05-29 | End: 2021-06-01 | Stop reason: HOSPADM

## 2021-05-29 RX ORDER — ONDANSETRON 2 MG/ML
4 INJECTION INTRAMUSCULAR; INTRAVENOUS ONCE
Status: COMPLETED | OUTPATIENT
Start: 2021-05-29 | End: 2021-05-29

## 2021-05-29 RX ORDER — POTASSIUM CHLORIDE 20 MEQ/1
40 TABLET, EXTENDED RELEASE ORAL ONCE
Status: DISCONTINUED | OUTPATIENT
Start: 2021-05-29 | End: 2021-05-29

## 2021-05-29 RX ORDER — POTASSIUM CHLORIDE 7.45 MG/ML
10 INJECTION INTRAVENOUS PRN
Status: DISCONTINUED | OUTPATIENT
Start: 2021-05-29 | End: 2021-06-01 | Stop reason: HOSPADM

## 2021-05-29 RX ORDER — SODIUM CHLORIDE 0.9 % (FLUSH) 0.9 %
5-40 SYRINGE (ML) INJECTION PRN
Status: DISCONTINUED | OUTPATIENT
Start: 2021-05-29 | End: 2021-06-01 | Stop reason: HOSPADM

## 2021-05-29 RX ORDER — ACETAMINOPHEN 325 MG/1
650 TABLET ORAL EVERY 6 HOURS PRN
Status: DISCONTINUED | OUTPATIENT
Start: 2021-05-29 | End: 2021-06-01 | Stop reason: HOSPADM

## 2021-05-29 RX ORDER — ACETAMINOPHEN 650 MG/1
650 SUPPOSITORY RECTAL EVERY 6 HOURS PRN
Status: DISCONTINUED | OUTPATIENT
Start: 2021-05-29 | End: 2021-06-01 | Stop reason: HOSPADM

## 2021-05-29 RX ORDER — 0.9 % SODIUM CHLORIDE 0.9 %
500 INTRAVENOUS SOLUTION INTRAVENOUS ONCE
Status: COMPLETED | OUTPATIENT
Start: 2021-05-29 | End: 2021-05-29

## 2021-05-29 RX ADMIN — POTASSIUM BICARBONATE 40 MEQ: 782 TABLET, EFFERVESCENT ORAL at 15:58

## 2021-05-29 RX ADMIN — IOPAMIDOL 75 ML: 755 INJECTION, SOLUTION INTRAVENOUS at 13:19

## 2021-05-29 RX ADMIN — ONDANSETRON 4 MG: 2 INJECTION INTRAMUSCULAR; INTRAVENOUS at 12:35

## 2021-05-29 RX ADMIN — LORAZEPAM 1 MG: 2 INJECTION INTRAMUSCULAR; INTRAVENOUS at 12:35

## 2021-05-29 RX ADMIN — Medication 10 ML: at 19:42

## 2021-05-29 RX ADMIN — ONDANSETRON 4 MG: 2 INJECTION INTRAMUSCULAR; INTRAVENOUS at 19:42

## 2021-05-29 RX ADMIN — SODIUM CHLORIDE 500 ML: 9 INJECTION, SOLUTION INTRAVENOUS at 15:37

## 2021-05-29 RX ADMIN — MORPHINE SULFATE 4 MG: 4 INJECTION, SOLUTION INTRAMUSCULAR; INTRAVENOUS at 12:35

## 2021-05-29 ASSESSMENT — ENCOUNTER SYMPTOMS
ABDOMINAL PAIN: 0
DIARRHEA: 0
NAUSEA: 0
VOMITING: 0
SHORTNESS OF BREATH: 1
CHEST TIGHTNESS: 0

## 2021-05-29 ASSESSMENT — PAIN SCALES - GENERAL
PAINLEVEL_OUTOF10: 9
PAINLEVEL_OUTOF10: 0
PAINLEVEL_OUTOF10: 9

## 2021-05-29 NOTE — H&P
HOSPITALISTS HISTORY AND PHYSICAL    5/29/2021 5:05 PM    Patient Information:  Gold Puckett is a 41 y.o. female [id number]  PCP:  AMY Díaz CNP (Tel: None )    Chief complaint:    Chief Complaint   Patient presents with    Chest Pain     pt in by [employer] from [employer]- history of ulcers- states started having chest pain upper left quad into arm. describes as sharp, hurts when she breathes in- states ppl at [facility] gave her an ASA. History of Present Illness:  Nam Zayas is a 41 y.o. female with h/o depression and ulcers presents with c/o fatigue, dyspnea and chest discomfort ongoing for one day. Lab work reveals pancytopenia with severe anemia hb 4.5, Leucopenia WBC 2.9 K  And thrombocytopenia plt count of 95. The pt denies black stool, rectal bleeding, hematuria. Stool is neg for occult blood. Denies heavy periods. Denies h/o anemia or blood transfusion . Last CBC in feb,2020 and prior showed normal hb and platelet count. Was on acid medications for ulcers in the past. Not on any currently. She states she has been under a lot of personal stressor and divorce. She also reports unintentional weight loss of 15 pounds. Family history of heart disease. Denies h/o cancer . Does not take any medications currently. Cardiac work up is non acute troponin < 0.01. EKG NSR    REVIEW OF SYSTEMS:   Constitutional: Negative for fever,chills or night sweats  ENT: Negative for rhinorrhea, epistaxis, hoarseness, sore throat. Respiratory: Negative for shortness of breath,wheezing  Cardiovascular: Negative for chest pain, palpitations   Gastrointestinal: Negative for nausea, vomiting, diarrhea  Genitourinary: Negative for polyuria, dysuria   Hematologic/Lymphatic: Negative for bleeding tendency, easy bruising  Musculoskeletal: Negative for myalgias and arthralgias  Neurologic: Negative for confusion,dysarthria.   Skin: Negative for itching,rash  Psychiatric: Negative for depression,anxiety, agitation. Endocrine: Negative for polydipsia,polyuria,heat /cold intolerance. Past Medical History:   has a past medical history of Postpartum depression and Ulcer. Past Surgical History:   has a past surgical history that includes Vagina surgery (3/28/2010). Medications:  No current facility-administered medications on file prior to encounter. Current Outpatient Medications on File Prior to Encounter   Medication Sig Dispense Refill    escitalopram (LEXAPRO) 20 MG tablet       naproxen (NAPROSYN) 500 MG tablet Take 1 tablet by mouth 2 times daily for 20 doses 20 tablet 0    hydrOXYzine (VISTARIL) 25 MG capsule Take 1 capsule by mouth 3 times daily as needed for Anxiety 20 capsule 0    ibuprofen (ADVIL;MOTRIN) 800 MG tablet Take 1 tablet by mouth every 8 hours as needed for Pain 20 tablet 0    famotidine (PEPCID) 20 MG tablet Take 1 tablet by mouth 2 times daily for 5 days 10 tablet 0     Current Facility-Administered Medications   Medication Dose Route Frequency Provider Last Rate Last Admin    0.9 % sodium chloride infusion   Intravenous PRN Steff Mccracken PA-C         Current Outpatient Medications   Medication Sig Dispense Refill    escitalopram (LEXAPRO) 20 MG tablet       naproxen (NAPROSYN) 500 MG tablet Take 1 tablet by mouth 2 times daily for 20 doses 20 tablet 0    hydrOXYzine (VISTARIL) 25 MG capsule Take 1 capsule by mouth 3 times daily as needed for Anxiety 20 capsule 0    ibuprofen (ADVIL;MOTRIN) 800 MG tablet Take 1 tablet by mouth every 8 hours as needed for Pain 20 tablet 0    famotidine (PEPCID) 20 MG tablet Take 1 tablet by mouth 2 times daily for 5 days 10 tablet 0       Allergies: Allergies   Allergen Reactions    Cephalosporins Hives        Social History:   reports that she quit smoking about 13 years ago. She smoked 0.50 packs per day.  She has never used smokeless tobacco. She reports current alcohol use. She reports that she does not use drugs. Family History:  family history includes Arthritis in her father and mother; Asthma in her father; Cancer in her maternal aunt; Diabetes in her father; High Blood Pressure in her father. ,     Physical Exam:  /60   Pulse 71   Temp 98 °F (36.7 °C) (Oral)   Resp 14   Ht 5' 2\" (1.575 m)   Wt 125 lb (56.7 kg)   LMP 04/30/2021   SpO2 100%   BMI 22.86 kg/m²     General appearance:  Appears comfortable. Well nourished  Eyes: Sclera clear, pupils equal  ENT: Moist mucus membranes, no thrush. Trachea midline. Cardiovascular: Regular rhythm, normal S1, S2. No murmur, gallop, rub. No edema in lower extremities  Respiratory: Clear to auscultation bilaterally, no wheeze, good inspiratory effort  Gastrointestinal: Abdomen soft, non-tender, not distended, normal bowel sounds  Musculoskeletal: No cyanosis in digits, neck supple  Neurology: Cranial nerves grossly intact. Alert and oriented in time, place and person. No speech or motor deficits  Psychiatry: Appropriate affect. Not agitated  Skin: Warm, dry, normal turgor, no rash    Labs:  CBC:   Lab Results   Component Value Date    WBC 2.9 05/29/2021    RBC 1.48 05/29/2021    HGB 4.5 05/29/2021    HCT 13.7 05/29/2021    MCV 92.2 05/29/2021    MCH 30.5 05/29/2021    MCHC 33.1 05/29/2021    RDW 13.3 05/29/2021    PLT 95 05/29/2021    MPV 7.4 05/29/2021     BMP:    Lab Results   Component Value Date     05/29/2021    K 3.2 05/29/2021     05/29/2021    CO2 19 05/29/2021    BUN 9 05/29/2021    CREATININE 0.5 05/29/2021    CALCIUM 8.0 05/29/2021    GFRAA >60 05/29/2021    GFRAA >60 04/14/2012    LABGLOM >60 05/29/2021    GLUCOSE 92 05/29/2021       Chest Xray:   EKG:        Problem List  Active Problems:    Pancytopenia ([address]  Resolved Problems:    * No resolved hospital problems. *        Assessment/Plan:         1.  Pancytopenia with thrombocytopenia (95K) ,anemia ( 4.5) and leucopenia ( WBC 2.9)  Severe anemia hb 4.5 upon presentation   Getting  3 units of PRBC  Cont to monitor h/h  Cont IV fluids  NO GI / source identified  Iron studies and peripheral smear ordered  HEme/onc consulted      Hypokalemia K 3.2  Replace and recheck    Admit as inpatient. I anticipate hospitalization spanning more than two midnights for investigation and treatment of the above medically necessary diagnoses.       Misty Thomas MD    5/29/2021 5:05 PM

## 2021-05-29 NOTE — ED PROVIDER NOTES
also reports that she is openly anxious about the chest pain causing her to have increasing symptoms and fears related to the above-mentioned. Patient states she is not having fevers chills or cough. She denies a history of abdominal or flank pain. Denies nausea vomiting or diarrhea. Denies any additional GI or  complaints and with the above-mentioned presents the ED for evaluation and treatment. Upon further questioning her only cardiovascular risk factor is previous smoking of relatively short duration as well as coronary artery disease in her father. She denies obesity, diabetes, hypertension, hypercholesterolemia or cocaine use. Nursing Notes were all reviewed and agreed with or any disagreements were addressed in the HPI. REVIEW OF SYSTEMS    (2-9 systems for level 4, 10 or more for level 5)     Review of Systems   Constitutional: Negative for activity change, chills and fever. Respiratory: Positive for shortness of breath. Negative for chest tightness. Cardiovascular: Positive for chest pain. Gastrointestinal: Negative for abdominal pain, diarrhea, nausea and vomiting. Genitourinary: Negative for dysuria and flank pain. Skin: Negative for wound. Neurological: Negative for seizures, numbness and headaches. Positives and Pertinent negatives as per HPI. Except as noted above in the ROS, all other systems were reviewed and negative.        PAST MEDICAL HISTORY     Past Medical History:   Diagnosis Date    Postpartum depression     Ulcer          SURGICAL HISTORY     Past Surgical History:   Procedure Laterality Date    VAGINA SURGERY  3/28/2010    repair of vaginal tear         CURRENTMEDICATIONS       Previous Medications    ESCITALOPRAM (LEXAPRO) 20 MG TABLET        FAMOTIDINE (PEPCID) 20 MG TABLET    Take 1 tablet by mouth 2 times daily for 5 days    HYDROXYZINE (VISTARIL) 25 MG CAPSULE    Take 1 capsule by mouth 3 times daily as needed for Anxiety    IBUPROFEN (ADVIL;MOTRIN) significant peripheral edema in either lower extremity. Pulmonary:      Effort: Pulmonary effort is normal. Tachypnea present. No accessory muscle usage or respiratory distress. Breath sounds: Normal breath sounds and air entry. No stridor, decreased air movement or transmitted upper airway sounds. No wheezing, rhonchi or rales. Abdominal:      General: There is no distension. Palpations: Abdomen is soft. Abdomen is not rigid. There is no mass. Tenderness: There is no abdominal tenderness. There is no guarding or rebound. Musculoskeletal:      Cervical back: Normal range of motion. Right lower leg: No edema. Left lower leg: No edema. Skin:     General: Skin is warm and dry. Neurological:      General: No focal deficit present. Mental Status: She is alert and oriented to person, place, and time. GCS: GCS eye subscore is 4. GCS verbal subscore is 5. GCS motor subscore is 6. Cranial Nerves: No cranial nerve deficit. Sensory: No sensory deficit. Coordination: Coordination normal.   Psychiatric:         Mood and Affect: Mood is anxious. Behavior: Behavior normal. Behavior is cooperative.          DIAGNOSTIC RESULTS   LABS:    Labs Reviewed   BASIC METABOLIC PANEL - Abnormal; Notable for the following components:       Result Value    Potassium 3.2 (*)     CO2 19 (*)     CREATININE 0.5 (*)     Calcium 8.0 (*)     All other components within normal limits    Narrative:     Performed at:  [facility] [address], [place], [facility] [address]   Phone [phone]   CBC WITH AUTO DIFFERENTIAL - Abnormal; Notable for the following components:    WBC 2.9 (*)     RBC 1.48 (*)     Hemoglobin 4.5 (*)     Hematocrit 13.7 (*)     Platelets 95 (*)     Lymphocytes Absolute 0.9 (*)     Hypochromia 1+ (*)     Tyler Cells Occasional (*)     Tear Drop Cells Occasional (*)     All other components within normal limits    Narrative:     [facility] [phone],  Hematology results called to and read back by GRACIE Michelle, 05/29/2021  12:44, by Louisa Gibson  Performed at:  [facility]  [address],  [place], [address]   Phone [phone]   HEPATIC FUNCTION PANEL - Abnormal; Notable for the following components: Total Protein 6.3 (*)     Alkaline Phosphatase 34 (*)     ALT 7 (*)     AST 11 (*)     All other components within normal limits    Narrative:     Performed at:  [facility] [address],  [place], [address]   Phone [phone]   TROPONIN    Narrative:     Performed at:  [facility] [address] [facility] [address],  [place], [address]   Phone [phone]   HCG, SERUM, QUALITATIVE    Narrative:     Performed at:  [facility] [address] [facility] [address],  [place], [address]   Phone [phone]   BRAIN NATRIURETIC PEPTIDE    Narrative:     Performed at:  [facility] [address],  [place], [address]   Phone [phone]   BLOOD OCCULT STOOL DIAGNOSTIC    Narrative:     ORDER#: [id number]                          ORDERED BY: Kevin Shepherd  SOURCE: Stool                              COLLECTED:  05/29/21 12:55  ANTIBIOTICS AT TAMIR.:                      RECEIVED :  05/29/21 12:58  Performed at:  [facility] [address],  [place], [address]   Phone [phone]   IRON AND TIBC   FERRITIN   COVID-19   COVID-19   COVID-19   COVID-19   TYPE AND SCREEN    Narrative:     Performed at:  [facility] [address],  [place], [address]   Phone [phone]   PREPARE RBC (CROSSMATCH)       All other labs were within normal range or not returned as of this dictation. EKG:  All EKG's are interpreted by the Emergency Department Physician in the absence of a cardiologist.  Please see their note for interpretation of EKG.    RADIOLOGY:   Non-plain film images such as CT, Ultrasound and MRI are read by the radiologist. Plain radiographic images are visualized and preliminarily interpreted by the ED Provider with the below findings:        Interpretation per the Radiologist below, if available at the time of this note:    CT ABDOMEN PELVIS W IV CONTRAST Additional Contrast? None   Final Result   Negative. No findings to correlate with anemia         CT CHEST PULMONARY EMBOLISM W CONTRAST   Preliminary Result   1. No CT evidence of a pulmonary embolism. 2. No acute intrapulmonary findings. XR CHEST (2 VW)   Final Result   No acute process. PROCEDURES   Unless otherwise noted below, none     Procedures    CRITICAL CARE TIME   Because of the high probability of sudden clinical deterioration of the patients condition and to prevent further deterioration, my critical care time involved my full attention to the patients condition, and included chart data review, documentation, medication ordering, viewing the patients old records, reevaluation of the patient's cardiac, pulmonary, and neurological status. Reassessing vital signs. Consutlations with off service physician. Ordering, interpreting reviewing diagnostic testing. Therefore my critical care time was 52 minutes of direct attention to the patients condition and did not include time spent on procedures. I have discussed with the patient the rationale for blood transfusion, its benefits in treating or preventing symptomatic anemia and dangerously low hemoglobin levels which could lead to fatigue, organ damage or death, and its risk which include: mild transfusion reactions, rare risk of blood borne infection, or more serious but rare allergic reactions. I have discussed the alternatives to transfusion, including the risk and consequences of not receiving transfusion.  The patient had an opportunity to ask questions and had agreed to proceed with transfusion of packed red blood cells. SEP-1 CORE MEASURE DATA    Classification: exclude from core measure    Exclusion criteria: the patient is NOT to be included for sepsis due to: Infection is not suspected      CONSULTS:  None      EMERGENCY DEPARTMENT COURSE and DIFFERENTIAL DIAGNOSIS/MDM:   Vitals:    Vitals:    05/29/21 1345 05/29/21 1356 05/29/21 1400 05/29/21 1415   BP: (!) 108/54  (!) 90/56 (!) 91/50   Pulse: 78  65 69   Resp: 20  18 19   Temp:  97.6 °F (36.4 °C)     TempSrc:  Oral     SpO2: 100%  100% 98%   Weight:       Height:           Patient was given the following medications:  Medications   0.9 % sodium chloride infusion (has no administration in time range)   potassium chloride (KLOR-CON M) extended release tablet 40 mEq (has no administration in time range)   0.9 % sodium chloride bolus (has no administration in time range)   LORazepam (ATIVAN) injection 1 mg (1 mg Intravenous Given 5/29/21 1235)   morphine injection 4 mg (4 mg Intravenous Given 5/29/21 1235)   ondansetron (ZOFRAN) injection 4 mg (4 mg Intravenous Given 5/29/21 1235)   iopamidol (ISOVUE-370) 76 % injection 75 mL (75 mLs Intravenous Given 5/29/21 1319)           The patient's detailed history of present illness is documented as above. Upon arrival to the emergency department the patient's vital signs are as documented. The patient is noted to be hemodynamically stable and afebrile. Physical examination findings are as above. IV access was obtained laboratory testing and work-up was initiated. EKG performed upon arrival demonstrates a sinus rhythm with a rate of 76. Normal axis and interval.  No evidence of acute ST elevation. Please see attending physician details for further EKG interpretation. Initial portable chest x-ray returns with no evidence of acute cardiopulmonary process. I was alerted by the lab that the patient was profoundly anemic with a hemoglobin of 4.5 hematocrit of 13.7.   Upon further questioning she has a history of difficulty as a pertains to a bleeding ulcer in the past but has not had any change in her stool. She states she has not had any significant menorrhagia. She states that she normally bleeds approximately 4 days and at worst case scenario she will soak a pad every 3-4 hours for a total of may be 7 in 1 day. She denies any significant bleeding or bruising. Denies any other known medical condition for cause of the patient's profound anemia which likely is causing her chest pain and associated shortness of breath. When her CBC returns she is actually pancytopenic. She has a white blood cell count of 2.9. Platelet count is 95. Teardrops are noted and nii cells are noted. She is hypochromic and also has lymphopenia 0.9. BUN is 9 creatinine is 0.5 nonfasting glucose is 92. She is hypokalemia at 3.2 and this was replaced as above. CO2 at 19 correlates to the active hyperventilation that she had upon arrival and she has a normal gap of 11. Patient's troponin is less than 0.01.  proBNP is 39. LFTs unremarkable for significant abnormality although she has a low protein at 6.3. No transaminitis bilirubinemia and her alkaline phosphatase is 34. Pregnancy is negative. Hemoccult completed is negative. Type and screen is a positive. 3 units are crossed into her been ordered. Iron ferritin labs had already been added. CT chest abdomen and pelvis is completed. CT of the chest demonstrates no evidence of acute cardiopulmonary finding and no evidence of pulmonary embolism. CT abdomen and pelvis does not have any explanation or guidance into the patient's profound anemia. Unfortunate this will require ongoing care management on an inpatient basis. Case was discussed with the hospitalist.  Patient will be admitted to medical surgical services for ongoing care management the diagnoses below. Justin Wynn FINAL IMPRESSION      1. Pancytopenia ([address]    2. Symptomatic anemia    3.  Chest pain, unspecified type    4. Dyspnea, unspecified type    5. Hypokalemia    6.  Anxiety about health          DISPOSITION/PLAN   DISPOSITION: Admit medical stable condition         (Please note that portions of this note were completed with a voice recognition program.  Efforts were made to edit the dictations but occasionally words are mis-transcribed.)    Lisa Blum PA-C (electronically signed)           Arleen Crabtree PA-C  05/29/21 1556

## 2021-05-29 NOTE — LETTER
[facility]  [address]  Phone: [phone]             June 1, 2021    Patient: Nam Zayas   YOB: 1980   Date of Visit: 5/29/2021       To Whom It May Concern:    Nam Zayas was seen and treated in our facility  beginning 5/29/2021 until 6/1/2021. She may return to work on 6/4/2021 with no restrictions.       Sincerely,       Fer Evangelista RN         Signature:__________________________________
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
## 2021-05-29 NOTE — ED NOTES
Blood transfusion initiated. Patient educated on s/s of transfusion reactions and verbalized understanding. VSS. This RN will remain at bedside for the first 15 minutes to watch for reaction.       Kitty Baptiste RN  05/29/21 3520

## 2021-05-29 NOTE — ED NOTES
Patient being transported to [facility] via wheelchair.       Scott Qureshi RN  05/29/21 2308

## 2021-05-29 NOTE — ED PROVIDER NOTES
The Ekg interpreted by me in the absence of a cardiologist shows. normal sinus rhythm with a rate of 76  Axis is   Normal  QTc is  normal  Intervals and Durations are unremarkable. No specific ST-T wave changes appreciated. No evidence of acute ischemia. No significant change from prior EKG dated 2/26/20    I only performed EKG interpretation and was not otherwise involved in the care of this patient.             Philemon Cooks, MD  05/29/21 2952

## 2021-05-29 NOTE — ED NOTES
Patient is alert and oriented. States she has chest pain that started this morning. Bed locked and in lowest position. Call light within reach.       Katy Gutierrez RN  05/29/21 4921

## 2021-05-30 PROBLEM — R07.2 PRECORDIAL PAIN: Status: ACTIVE | Noted: 2021-05-30

## 2021-05-30 LAB
ANION GAP SERPL CALCULATED.3IONS-SCNC: 9 MMOL/L (ref 3–16)
APTT: 28 SEC (ref 24.2–36.2)
BANDED NEUTROPHILS RELATIVE PERCENT: 1 % (ref 0–7)
BASOPHILS ABSOLUTE: 0 K/UL (ref 0–0.2)
BASOPHILS ABSOLUTE: 0.1 K/UL (ref 0–0.2)
BASOPHILS ABSOLUTE: 0.1 K/UL (ref 0–0.2)
BASOPHILS RELATIVE PERCENT: 0.5 %
BASOPHILS RELATIVE PERCENT: 0.6 %
BASOPHILS RELATIVE PERCENT: 1 %
BLOOD SMEAR REVIEW: NORMAL
BUN BLDV-MCNC: 8 MG/DL (ref 7–20)
BURR CELLS: ABNORMAL
CALCIUM SERPL-MCNC: 8.6 MG/DL (ref 8.3–10.6)
CHLORIDE BLD-SCNC: 105 MMOL/L (ref 99–110)
CHOLESTEROL, TOTAL: 148 MG/DL (ref 0–199)
CO2: 23 MMOL/L (ref 21–32)
CREAT SERPL-MCNC: 0.5 MG/DL (ref 0.6–1.1)
EKG ATRIAL RATE: 76 BPM
EKG DIAGNOSIS: NORMAL
EKG P AXIS: 68 DEGREES
EKG P-R INTERVAL: 140 MS
EKG Q-T INTERVAL: 402 MS
EKG QRS DURATION: 70 MS
EKG QTC CALCULATION (BAZETT): 452 MS
EKG R AXIS: 57 DEGREES
EKG T AXIS: 50 DEGREES
EKG VENTRICULAR RATE: 76 BPM
EOSINOPHILS ABSOLUTE: 0 K/UL (ref 0–0.6)
EOSINOPHILS ABSOLUTE: 0.1 K/UL (ref 0–0.6)
EOSINOPHILS ABSOLUTE: 0.1 K/UL (ref 0–0.6)
EOSINOPHILS RELATIVE PERCENT: 0 %
EOSINOPHILS RELATIVE PERCENT: 0.5 %
EOSINOPHILS RELATIVE PERCENT: 0.6 %
FERRITIN: 171.7 NG/ML (ref 15–150)
FIBRINOGEN: 202 MG/DL (ref 200–397)
FOLATE: 18.29 NG/ML (ref 4.78–24.2)
GFR AFRICAN AMERICAN: >60
GFR NON-AFRICAN AMERICAN: >60
GLUCOSE BLD-MCNC: 88 MG/DL (ref 70–99)
HAPTOGLOBIN: 74 MG/DL (ref 30–200)
HCT VFR BLD CALC: 13.7 % (ref 36–48)
HCT VFR BLD CALC: 41.4 % (ref 36–48)
HCT VFR BLD CALC: 41.7 % (ref 36–48)
HCT VFR BLD CALC: 41.9 % (ref 36–48)
HCT VFR BLD CALC: 42.7 % (ref 36–48)
HCT VFR BLD CALC: 44.7 % (ref 36–48)
HDLC SERPL-MCNC: 56 MG/DL (ref 40–60)
HEMATOLOGY PATH CONSULT: YES
HEMOGLOBIN: 13.9 G/DL (ref 12–16)
HEMOGLOBIN: 13.9 G/DL (ref 12–16)
HEMOGLOBIN: 14.3 G/DL (ref 12–16)
HEMOGLOBIN: 14.3 G/DL (ref 12–16)
HEMOGLOBIN: 15 G/DL (ref 12–16)
HEMOGLOBIN: 4.5 G/DL (ref 12–16)
HYPOCHROMIA: ABNORMAL
INR BLD: 0.99 (ref 0.86–1.14)
IRON SATURATION: 39 % (ref 15–50)
IRON: 117 UG/DL (ref 37–145)
LACTATE DEHYDROGENASE: 299 U/L (ref 100–190)
LDL CHOLESTEROL CALCULATED: 81 MG/DL
LYMPHOCYTES ABSOLUTE: 0.9 K/UL (ref 1–5.1)
LYMPHOCYTES ABSOLUTE: 2.5 K/UL (ref 1–5.1)
LYMPHOCYTES ABSOLUTE: 2.8 K/UL (ref 1–5.1)
LYMPHOCYTES RELATIVE PERCENT: 21.7 %
LYMPHOCYTES RELATIVE PERCENT: 25.1 %
LYMPHOCYTES RELATIVE PERCENT: 30 %
MCH RBC QN AUTO: 29.8 PG (ref 26–34)
MCH RBC QN AUTO: 30 PG (ref 26–34)
MCH RBC QN AUTO: 30.5 PG (ref 26–34)
MCHC RBC AUTO-ENTMCNC: 33.1 G/DL (ref 31–36)
MCHC RBC AUTO-ENTMCNC: 33.6 G/DL (ref 31–36)
MCHC RBC AUTO-ENTMCNC: 33.6 G/DL (ref 31–36)
MCV RBC AUTO: 88.7 FL (ref 80–100)
MCV RBC AUTO: 89.4 FL (ref 80–100)
MCV RBC AUTO: 92.2 FL (ref 80–100)
MONOCYTES ABSOLUTE: 0.1 K/UL (ref 0–1.3)
MONOCYTES ABSOLUTE: 1.1 K/UL (ref 0–1.3)
MONOCYTES ABSOLUTE: 1.2 K/UL (ref 0–1.3)
MONOCYTES RELATIVE PERCENT: 10.3 %
MONOCYTES RELATIVE PERCENT: 5 %
MONOCYTES RELATIVE PERCENT: 9.8 %
NEUTROPHILS ABSOLUTE: 1.9 K/UL (ref 1.7–7.7)
NEUTROPHILS ABSOLUTE: 7.1 K/UL (ref 1.7–7.7)
NEUTROPHILS ABSOLUTE: 7.9 K/UL (ref 1.7–7.7)
NEUTROPHILS RELATIVE PERCENT: 63 %
NEUTROPHILS RELATIVE PERCENT: 63.5 %
NEUTROPHILS RELATIVE PERCENT: 67.4 %
PDW BLD-RTO: 13.3 % (ref 12.4–15.4)
PDW BLD-RTO: 13.4 % (ref 12.4–15.4)
PDW BLD-RTO: 13.5 % (ref 12.4–15.4)
PLATELET # BLD: 197 K/UL (ref 135–450)
PLATELET # BLD: 212 K/UL (ref 135–450)
PLATELET # BLD: 95 K/UL (ref 135–450)
PLATELET SLIDE REVIEW: ABNORMAL
PMV BLD AUTO: 7.4 FL (ref 5–10.5)
PMV BLD AUTO: 8 FL (ref 5–10.5)
PMV BLD AUTO: 8.7 FL (ref 5–10.5)
POTASSIUM SERPL-SCNC: 3.8 MMOL/L (ref 3.5–5.1)
POTASSIUM SERPL-SCNC: 4.6 MMOL/L (ref 3.5–5.1)
PROTHROMBIN TIME: 11.5 SEC (ref 10–13.2)
RBC # BLD: 1.48 M/UL (ref 4–5.2)
RBC # BLD: 4.78 M/UL (ref 4–5.2)
RBC # BLD: 5.04 M/UL (ref 4–5.2)
SARS-COV-2, PCR: NOT DETECTED
SLIDE REVIEW: ABNORMAL
SODIUM BLD-SCNC: 137 MMOL/L (ref 136–145)
TEAR DROP CELLS: ABNORMAL
TOTAL IRON BINDING CAPACITY: 299 UG/DL (ref 260–445)
TRIGL SERPL-MCNC: 57 MG/DL (ref 0–150)
TROPONIN: <0.01 NG/ML
VITAMIN B-12: 815 PG/ML (ref 211–911)
VLDLC SERPL CALC-MCNC: 11 MG/DL
WBC # BLD: 11.3 K/UL (ref 4–11)
WBC # BLD: 11.7 K/UL (ref 4–11)
WBC # BLD: 2.9 K/UL (ref 4–11)

## 2021-05-30 PROCEDURE — 83540 ASSAY OF IRON: CPT

## 2021-05-30 PROCEDURE — 80061 LIPID PANEL: CPT

## 2021-05-30 PROCEDURE — 6370000000 HC RX 637 (ALT 250 FOR IP): Performed by: NURSE PRACTITIONER

## 2021-05-30 PROCEDURE — 80048 BASIC METABOLIC PNL TOTAL CA: CPT

## 2021-05-30 PROCEDURE — 84132 ASSAY OF SERUM POTASSIUM: CPT

## 2021-05-30 PROCEDURE — 85384 FIBRINOGEN ACTIVITY: CPT

## 2021-05-30 PROCEDURE — 85610 PROTHROMBIN TIME: CPT

## 2021-05-30 PROCEDURE — 82728 ASSAY OF FERRITIN: CPT

## 2021-05-30 PROCEDURE — 85014 HEMATOCRIT: CPT

## 2021-05-30 PROCEDURE — 99254 IP/OBS CNSLTJ NEW/EST MOD 60: CPT | Performed by: INTERNAL MEDICINE

## 2021-05-30 PROCEDURE — 1200000000 HC SEMI PRIVATE

## 2021-05-30 PROCEDURE — 2580000003 HC RX 258: Performed by: FAMILY MEDICINE

## 2021-05-30 PROCEDURE — 84484 ASSAY OF TROPONIN QUANT: CPT

## 2021-05-30 PROCEDURE — 85018 HEMOGLOBIN: CPT

## 2021-05-30 PROCEDURE — 85025 COMPLETE CBC W/AUTO DIFF WBC: CPT

## 2021-05-30 PROCEDURE — 82746 ASSAY OF FOLIC ACID SERUM: CPT

## 2021-05-30 PROCEDURE — 36415 COLL VENOUS BLD VENIPUNCTURE: CPT

## 2021-05-30 PROCEDURE — 83010 ASSAY OF HAPTOGLOBIN QUANT: CPT

## 2021-05-30 PROCEDURE — 83615 LACTATE (LD) (LDH) ENZYME: CPT

## 2021-05-30 PROCEDURE — 82607 VITAMIN B-12: CPT

## 2021-05-30 PROCEDURE — 83550 IRON BINDING TEST: CPT

## 2021-05-30 PROCEDURE — 6370000000 HC RX 637 (ALT 250 FOR IP): Performed by: FAMILY MEDICINE

## 2021-05-30 PROCEDURE — 93010 ELECTROCARDIOGRAM REPORT: CPT | Performed by: INTERNAL MEDICINE

## 2021-05-30 PROCEDURE — 85730 THROMBOPLASTIN TIME PARTIAL: CPT

## 2021-05-30 RX ORDER — PANTOPRAZOLE SODIUM 40 MG/1
40 TABLET, DELAYED RELEASE ORAL
Status: DISCONTINUED | OUTPATIENT
Start: 2021-05-30 | End: 2021-06-01 | Stop reason: HOSPADM

## 2021-05-30 RX ADMIN — SODIUM CHLORIDE: 9 INJECTION, SOLUTION INTRAVENOUS at 02:01

## 2021-05-30 RX ADMIN — ACETAMINOPHEN 650 MG: 325 TABLET ORAL at 16:02

## 2021-05-30 RX ADMIN — FAMOTIDINE 20 MG: 20 TABLET ORAL at 08:45

## 2021-05-30 RX ADMIN — PANTOPRAZOLE SODIUM 40 MG: 40 TABLET, DELAYED RELEASE ORAL at 16:02

## 2021-05-30 RX ADMIN — Medication 10 ML: at 21:00

## 2021-05-30 ASSESSMENT — PAIN SCALES - GENERAL
PAINLEVEL_OUTOF10: 0
PAINLEVEL_OUTOF10: 8
PAINLEVEL_OUTOF10: 0
PAINLEVEL_OUTOF10: 0

## 2021-05-30 NOTE — PROGRESS NOTES
Pt received 3 units of blood total, tolerated very well. Will continue to monitor.   Miah Meadows RN

## 2021-05-30 NOTE — PROGRESS NOTES
Hospitalist Progress Note      PCP: Joi Valencia, APRN - CNP    Date of Admission: 5/29/2021    Chief Complaint: Sultana Pouch chest pain from epigastric up left-sided chest to shoulder    Hospital Course: Ej De Leon is a 41 y.o. female with h/o depression and ulcers presents with c/o chest discomfort ongoing for one day. Lab work reveals pancytopenia with severe anemia hb 4.5, Leucopenia WBC 2.9 K  And thrombocytopenia plt count of 95. The pt denies black stool, rectal bleeding, hematuria. Stool is neg for occult blood. Denies heavy periods. Denies h/o anemia or blood transfusion . Last CBC in feb,2020 and prior showed normal hb and platelet count. Was on acid medications for ulcers in the past. Not on any currently. She states she has been under a lot of personal stressor and divorce. She also reports unintentional weight loss of 15 pounds. Family history of heart disease. Denies h/o cancer . Does not take any medications currently. Cardiac work up is non acute troponin < 0.01. Subjective: Patient reports she is concerned about laboratory findings of the low hemoglobin, but she states she feels better today. Denies any chest pain or epigastric tenderness. Reports a history of gastric ulcers. Denies shortness of breath, palpitations, nausea vomiting, diarrhea dysuria headache lightheadedness or dizziness. Appetite fair. Voiding without difficulty. Reviewed plan of care with patient, denied further needs or questions. Assessment/Plan:    Pancytopenia  -hgb 4.5, white blood cell 2.9, platelet 95 on admission  -hgb now 15, white blood cell 11.3, platelet 968 after 3 units of blood  -heme consulted, exact etiology of transient pancytopenia is unclear.   She will need follow-up next week to recheck CBC in the office.  -No tachycardia or shortness of breath on admission  -Chest x-ray nonacute, CT PE study nonacute, CT abdomen nonacute  -Occult stool negative for blood  -INR 0.99    Chest pain  Abnormal EKG  -T wave inversions v1 and v2- new for v2, hx of v1 t wave inversions on ekg in 2018  -troponin < 0.01 x 2  -cardiology consulted    History of gastric ulcers  -May be cause of chest pain  -Stop Pepcid  -Start Protonix to 40 mg p.o. twice daily  -If cardiology finds no cardiac concerns will discuss with patient GI work-up    Abnormal mammogram  Unintentional 15 pound weight loss  -Patient was instructed have repeat mammogram in 6 months, she needs to have this scheduled  -Patient does report increased stress over the last few months due to divorce      Active Hospital Problems    Diagnosis     [facility] [E09.282]        Medications:  Reviewed    Infusion Medications    sodium chloride      sodium chloride 100 mL/hr at 05/30/21 0164    sodium chloride      sodium chloride      sodium chloride       Scheduled Medications    famotidine  20 mg Oral BID    sodium chloride flush  5-40 mL Intravenous 2 times per day     PRN Meds: sodium chloride, potassium chloride **OR** potassium alternative oral replacement **OR** potassium chloride, sodium chloride flush, sodium chloride, promethazine **OR** ondansetron, polyethylene glycol, acetaminophen **OR** acetaminophen, sodium chloride      Intake/Output Summary (Last 24 hours) at 5/30/2021 1013  Last data filed at 5/30/2021 8641  Gross per 24 hour   Intake 1938.08 ml   Output 1000 ml   Net 938.08 ml       Physical Exam Performed:    /70   Pulse 76   Temp 97.7 °F (36.5 °C) (Temporal)   Resp 15   Ht 5' 2\" (1.575 m)   Wt 132 lb 3.2 oz (60 kg)   LMP 04/30/2021   SpO2 98%   BMI 24.18 kg/m²     General appearance: No apparent distress, appears stated age and cooperative. HEENT: Pupils equal, round, and reactive to light. Conjunctivae/corneas clear. Neck: Supple, with full range of motion. No jugular venous distention. Trachea midline. Respiratory:  Normal respiratory effort.  Clear to auscultation, bilaterally without Rales/Wheezes/Rhonchi. Cardiovascular: Regular rate and rhythm with normal S1/S2 without murmurs, rubs or gallops. Abdomen: Soft, non-tender, non-distended with normal bowel sounds. Musculoskeletal: No clubbing, cyanosis or edema bilaterally. Full range of motion without deformity. Skin: Skin color, texture, turgor normal.  No rashes or lesions. Neurologic:  Neurovascularly intact without any focal sensory/motor deficits. Cranial nerves: II-XII intact, grossly non-focal.  Psychiatric: Alert and oriented, thought content appropriate, normal insight  Capillary Refill: Brisk,< 3 seconds   Peripheral Pulses: +2 palpable, equal bilaterally       Labs:   Recent Labs     05/29/21  1223 05/30/21  0159 05/30/21  0423 05/30/21  0901   WBC 2.9*  --  11.7* 11.3*   HGB 4.5* 14.3 14.3 15.0   HCT 13.7* 41.9 42.7 44.7   PLT 95*  --  197 212     Recent Labs     05/29/21  1223 05/30/21  0159 05/30/21  0423     --  137   K 3.2* 4.6 3.8     --  105   CO2 19*  --  23   BUN 9  --  8   CREATININE 0.5*  --  0.5*   CALCIUM 8.0*  --  8.6     Recent Labs     05/29/21  1223   AST 11*   ALT 7*   BILIDIR <0.2   BILITOT 0.4   ALKPHOS 34*     Recent Labs     05/30/21  0158   INR 0.99     Recent Labs     05/29/21  1223 05/30/21  0423   TROPONINI <0.01 <0.01       Urinalysis:      Lab Results   Component Value Date    NITRU Negative 09/14/2017    WBCUA 0 09/14/2017    BACTERIA RARE 06/25/2017    RBCUA 1 09/14/2017    BLOODU SMALL 09/14/2017    SPECGRAV 1.010 09/14/2017    GLUCOSEU Negative 09/14/2017    GLUCOSEU NEGATIVE 04/14/2012       Radiology:  CT ABDOMEN PELVIS W IV CONTRAST Additional Contrast? None   Final Result   Negative. No findings to correlate with anemia         CT CHEST PULMONARY EMBOLISM W CONTRAST   Final Result   1. No CT evidence of a pulmonary embolism. 2. No acute intrapulmonary findings. XR CHEST (2 VW)   Final Result   No acute process.                  DVT Prophylaxis: SCDs  Diet: DIET GENERAL;  Code Status: Full Code    PT/OT Eval Status: No acute needs    Dispo -home once medically stable    AMY Zacarias - CNP      NOTE:  This report was transcribed using voice recognition software. Every effort was made to ensure accuracy; however, inadvertent computerized transcription errors may be present.

## 2021-05-30 NOTE — CONSULTS
[facility]   Consult Note      Reason for Consult:  Pancytopenia    Requesting Physician:  Dr. Chaves Ace:  Chest pain    History Obtained From: patient    HISTORY OF PRESENT ILLNESS:      49-year-old lady who is otherwise healthy came to the hospital yesterday with chest pain. She was noted to have pancytopenia with white count of 2.9, ANC 1.9, hemoglobin 4.5 and platelet count 95. She was given 3 units of PRBCs. Surprisingly today, WBCs are 11.7, hemoglobin 14.3 and platelet count is 053. No recent infections or antibiotic use. No headaches or dizziness. She does not have chest pain now. No shortness of breath. No abdominal pain constipation. No swelling in her legs. Past Medical History:     has a past medical history of Postpartum depression and Ulcer.    Past Surgical History:    Past Surgical History:   Procedure Laterality Date    VAGINA SURGERY  3/28/2010    repair of vaginal tear      Current Medications:    Current Facility-Administered Medications   Medication Dose Route Frequency Provider Last Rate Last Admin    0.9 % sodium chloride infusion   Intravenous PRN Dominique Aguirre PA-C        0.9 % sodium chloride infusion   Intravenous Continuous Felicita Hammond  mL/hr at 05/30/21 0642 Rate Verify at 05/30/21 0642    0.9 % sodium chloride infusion   Intravenous Continuous Nickolas Zabala MD        potassium chloride (KLOR-CON M) extended release tablet 40 mEq  40 mEq Oral PRN Nickolas Zabala MD        Or   Angelica Sanz potassium bicarb-citric acid (EFFER-K) effervescent tablet 40 mEq  40 mEq Oral PRN Nickolas Zabala MD        Or   Angelica Sanz potassium chloride 10 mEq/100 mL IVPB (Peripheral Line)  10 mEq Intravenous PRN Nickolas Zabala MD        famotidine (PEPCID) tablet 20 mg  20 mg Oral BID Nickolas Zabala MD        sodium chloride flush 0.9 % injection 5-40 mL  5-40 mL Intravenous 2 times per day Nickolas Zabala MD   10 mL at 05/29/21 1942    sodium chloride flush 0.9 % palpated, no hepatosplenomegally  MUSCULOSKELETAL:  full range of motion noted, tone is normal  EXTREMITIES: no LE edema  NEUROLOGIC:  Awake, alert, oriented to name, place and time. Motor skills grossly intact. SKIN:  normal skin color, texture, turgor and no jaundice. Appears intact. DATA:  General Labs:    CBC:   Recent Labs     05/29/21  1223 05/30/21  0159 05/30/21  0423   WBC 2.9*  --  11.7*   HGB 4.5* 14.3 14.3   HCT 13.7* 41.9 42.7   MCV 92.2  --  89.4   PLT 95*  --  197     BMP:   Recent Labs     05/29/21  1223 05/30/21  0159 05/30/21  0423     --  137   K 3.2* 4.6 3.8     --  105   CO2 19*  --  23   BUN 9  --  8   CREATININE 0.5*  --  0.5*     LIVER PROFILE:   Recent Labs     05/29/21  1223   AST 11*   ALT 7*   BILIDIR <0.2   BILITOT 0.4   ALKPHOS 34*     PT/INR:    Lab Results   Component Value Date    PROTIME 11.5 05/30/2021    PROTIME 11.5 06/20/2018    INR 0.99 05/30/2021    INR 1.01 06/20/2018     PTT:    Lab Results   Component Value Date    APTT 28.0 05/30/2021     Magnesium:    Lab Results   Component Value Date    MG 2.00 10/12/2017       Imaging:  XR CHEST (2 VW)    Result Date: 5/29/2021  EXAMINATION: TWO XRAY VIEWS OF THE CHEST 5/29/2021 12:42 pm COMPARISON: 02/26/2020 HISTORY: ORDERING SYSTEM PROVIDED HISTORY: chest pain TECHNOLOGIST PROVIDED HISTORY: Reason for exam:->chest pain Reason for Exam: Chest Pain (pt in by [facility] from [employer]- history of ulcers- states started having chest pain upper left quad into arm. describes as sharp, hurts when she breathes in- states ppl at [employer] gave her an ASA. ) Acuity: Unknown Type of Exam: Unknown FINDINGS: The lungs are without acute focal process. There is no effusion or pneumothorax. The cardiomediastinal silhouette is stable. The osseous structures are stable. No acute process.      CT ABDOMEN PELVIS W IV CONTRAST Additional Contrast? None    Result Date: 5/29/2021  EXAMINATION: CT OF THE ABDOMEN AND PELVIS WITH CONTRAST 5/29/2021 1:09 pm TECHNIQUE: CT of the abdomen and pelvis was performed with the administration of intravenous contrast. Multiplanar reformatted images are provided for review. Dose modulation, iterative reconstruction, and/or weight based adjustment of the mA/kV was utilized to reduce the radiation dose to as low as reasonably achievable. COMPARISON: 12/12/2017 HISTORY: ORDERING SYSTEM PROVIDED HISTORY: profound anemia TECHNOLOGIST PROVIDED HISTORY: Reason for exam:->profound anemia Additional Contrast?->None Decision Support Exception - unselect if not a suspected or confirmed emergency medical condition->Emergency Medical Condition (MA) Reason for Exam: Profound anemia. Chest Pain (pt in by [facility] from [facility] [employer]- history of ulcers- states started having chest pain upper left quad into arm. describes as sharp, hurts when she breathes in- states ppl at [facility] gave her an ASA. ). Acuity: Acute Type of Exam: Initial FINDINGS: Lower Chest: Lung bases clear Organs: Solid organs and gallbladder unremarkable GI/Bowel: No gastrointestinal abnormality demonstrated. No mesenteric adenopathy Pelvis: Reproductive organs within normal limits. Urinary bladder unremarkable. Trace pelvic fluid within physiologic limits. No pelvic adenopathy Peritoneum/Retroperitoneum: No retroperitoneal adenopathy. No ascites. Normal caliber aorta Bones/Soft Tissues: No bony abnormality     Negative. No findings to correlate with anemia     CT CHEST PULMONARY EMBOLISM W CONTRAST    Result Date: 5/30/2021  EXAMINATION: CTA OF THE CHEST 5/29/2021 1:08 pm TECHNIQUE: CTA of the chest was performed after the administration of intravenous contrast.  Multiplanar reformatted images are provided for review. MIP images are provided for review. Dose modulation, iterative reconstruction, and/or weight based adjustment of the mA/kV was utilized to reduce the radiation dose to as low as reasonably achievable.  COMPARISON: No prior chest CT available for comparison. Comparison made to prior abdominal CT dated 12/12/2017. HISTORY: ORDERING SYSTEM PROVIDED HISTORY: pleuritic chest pain and shortness of breath rule out pulmonary embolism TECHNOLOGIST PROVIDED HISTORY: Reason for exam:->pleuritic chest pain and shortness of breath rule out pulmonary embolism Decision Support Exception - unselect if not a suspected or confirmed emergency medical condition->Emergency Medical Condition (MA) Reason for Exam: Pleuritic chest pain and shortness of breath rule out pulmonary embolism. Chest Pain (pt in by [facility] from [facility] [employer]- history of ulcers- states started having chest pain upper left quad into arm. describes as sharp, hurts when she breathes in- states ppl at [facility] gave her an ASA. ). Acuity: Acute Type of Exam: Initial FINDINGS: Pulmonary Arteries: Pulmonary arteries are adequately opacified for evaluation. No evidence of intraluminal filling defect to suggest pulmonary embolism. Main pulmonary artery is normal in caliber. Mediastinum: The visualized thyroid is unremarkable. There is no pathologically enlarged lymphadenopathy within the chest or mediastinum. The intrathoracic aorta is unremarkable, without evidence of aneurysm or dissection. No pericardial abnormality is identified. Lungs/pleura: The central airways are patent. There is minimal dependent atelectasis within both lower lobes. The lungs are otherwise clear, without acute airspace consolidation. There is no evidence of a pneumothorax or pleural effusion. No suspicious pulmonary nodule or mass is identified. Upper Abdomen: The abdominal findings are reported separately. Soft Tissues/Bones: There is minimal degenerative change throughout the thoracic spine. No osteolytic or osteoblastic lesion is seen. 1. No CT evidence of a pulmonary embolism. 2. No acute intrapulmonary findings.            Assessment & Plan:    39-year-old lady who is otherwise healthy presented to the emergency room on 5/29/2021 with chest pain and was noted to have    1. Pancytopenia:    -WBC 2.9, ANC 1.9, hemoglobin 4.5, platelet count 95. -Iron studies-no deficiency  -B12 normal.  -Fibrinogen normal  -LDH slightly elevated at 299  -Peripheral smear did not show blasts or any other abnormal blood cells.  -She received 3 units of PRBCs  -Today on 5/30/2021, WBCs 11.7, hemoglobin 14.3, platelet count 941    -The exact etiology of transient pancytopenia is unclear    -Okay to discharge from hematology perspective.  -She will need follow-up visit next week to recheck CBC in the office. I have discussed the above stated plan with the patient and they verbalized understanding and agreed with the plan. Thank you for allowing us to participate in this patients care.     Jeremy Orozco MD  5/30/2021, 7:30 AM Stable.

## 2021-05-30 NOTE — CONSULTS
[address] [facility]  [phone]        Reason for Consultation/Chief Complaint: \" Chest pain. \"    History of Present Illness:  Harpal Ryan is a 41 y.o. patient who presented to the hospital with complaints of mid-sternal, sharp chest pain. Worse with deep breaths. Neck pain resolved on its own but she has some residual chest soreness. She in general has been very active and has had no complaints of chest discomfort previously. Patient has been under a lot of stress recently. Past Medical History:   has a past medical history of Postpartum depression and Ulcer. Surgical History:   has a past surgical history that includes Vagina surgery (3/28/2010). Social History:   reports that she quit smoking about 13 years ago. She smoked 0.50 packs per day. She has never used smokeless tobacco. She reports current alcohol use. She reports that she does not use drugs. Family History:  family history includes Arthritis in her father and mother; Asthma in her father; Cancer in her maternal aunt; Diabetes in her father; High Blood Pressure in her father. Home Medications:  Were reviewed and are listed in nursing record. and/or listed below  Prior to Admission medications    Medication Sig Start Date End Date Taking? Authorizing Provider   fluticasone (VERAMYST) 27.5 MCG/SPRAY nasal spray 2 sprays by Each Nostril route daily   Yes Historical Provider, MD   cetirizine (ZYRTEC) 10 MG tablet Take 10 mg by mouth daily   Yes Historical Provider, MD   escitalopram (LEXAPRO) 20 MG tablet  2/12/20   Historical Provider, MD   hydrOXYzine (VISTARIL) 25 MG capsule Take 1 capsule by mouth 3 times daily as needed for Anxiety 2/26/20   JUSTYNA BrookeC        Allergies:  Ibuprofen and Cephalosporins     Review of Systems:   A complete review of systems has been reviewed and updated today and is negative except as noted in the history of present illness.       Physical Examination:    Vitals: 05/30/21 2055   BP: 119/86   Pulse: 70   Resp: 20   Temp: 97.8 °F (36.6 °C)   SpO2: 98%    Weight: 132 lb 3.2 oz (60 kg)         General Appearance:  Alert, cooperative, no distress, appears stated age   Head:  Normocephalic, without obvious abnormality, atraumatic   Eyes:  EOMI, conjunctiva/corneas clear       Nose: Nares normal   Throat: Lips normal   Neck: Supple, symmetrical, trachea midline,  no carotid bruit or JVD       Lungs:   Clear to auscultation bilaterally, respirations unlabored   Chest Wall:  No tenderness or deformity   Heart:  Regular rate and rhythm, S1, S2 normal, no murmur, rub or gallop   Abdomen:   Soft, non-tender, bowel sounds active all four quadrants,  no masses, no organomegaly           Extremities: Extremities normal, atraumatic, no cyanosis or edema   Pulses: 2+ and symmetric   Skin: Skin color, texture, turgor normal, no rashes or lesions   Pysch: Normal mood and affect   Neurologic: Normal gross motor and sensory exam.         Labs  CBC:   Lab Results   Component Value Date    WBC 11.3 05/30/2021    RBC 5.04 05/30/2021    HGB 13.9 05/30/2021    HCT 41.4 05/30/2021    MCV 88.7 05/30/2021    RDW 13.5 05/30/2021     05/30/2021     CMP:    Lab Results   Component Value Date     05/30/2021    K 3.8 05/30/2021     05/30/2021    CO2 23 05/30/2021    BUN 8 05/30/2021    CREATININE 0.5 05/30/2021    GFRAA >60 05/30/2021    GFRAA >60 04/14/2012    AGRATIO 1.2 06/25/2017    LABGLOM >60 05/30/2021    GLUCOSE 88 05/30/2021    PROT 6.3 05/29/2021    PROT 7.6 04/14/2012    CALCIUM 8.6 05/30/2021    BILITOT 0.4 05/29/2021    ALKPHOS 34 05/29/2021    AST 11 05/29/2021    ALT 7 05/29/2021     LIPIDS: No components found for: TOTAL CHOLESTEROL,  HDL,  LDL,  TRIGLYCERIDES  PT/INR:  No results found for: PTINR  Lab Results   Component Value Date    TROPONINI <0.01 05/30/2021       EKG:  I have reviewed EKG with the following interpretation:  Impression:    29-MAY-2021 12:20:39 [facility]-FED ROUTINE RECORD  Normal sinus rhythm  Possible Left atrial enlargement  Septal infarct , age undetermined  Abnormal ECG    Imaging/Procedures:   CTPA 5/29/2021:  1. No CT evidence of a pulmonary embolism. 2. No acute intrapulmonary findings. Assessment/Plan:  Principal Problem:    Precordial pain  Plan: Overall sounds noncardiac. ECG suggesting criteria for septal infarct but may be lead position. Recommend Myoview stress test and 2D echo with Doppler. Active Problems:    Pancytopenia ([address]  Plan: Per hematology and primary service. Thank you for allowing us to participate in the care of [facility]. Further evaluation will be based upon the patient's clinical course and testing results. All questions and concerns were addressed to the patient/family. Alternatives to my treatment were discussed. The note was completed using EMR. Every effort was made to ensure accuracy; however, inadvertent computerized transcription errors may be present.     Dipak Molina M.D.

## 2021-05-30 NOTE — PROGRESS NOTES
Shift assessment completed, see doc flowsheets. Pt currently resting quietly in bed. Had an episode of nausea and vomiting, currently holding po pepcid this evening. Pt was medicated with zofran, but it didn't help. Pt currently still on 2nd unit of blood at this time. Call light within reach, will continue to monitor.   Alvarado Mchugh RN

## 2021-05-31 LAB
ANION GAP SERPL CALCULATED.3IONS-SCNC: 8 MMOL/L (ref 3–16)
BASOPHILS ABSOLUTE: 0.1 K/UL (ref 0–0.2)
BASOPHILS RELATIVE PERCENT: 0.7 %
BUN BLDV-MCNC: 10 MG/DL (ref 7–20)
CALCIUM SERPL-MCNC: 8.3 MG/DL (ref 8.3–10.6)
CHLORIDE BLD-SCNC: 109 MMOL/L (ref 99–110)
CO2: 22 MMOL/L (ref 21–32)
CREAT SERPL-MCNC: 0.6 MG/DL (ref 0.6–1.1)
EOSINOPHILS ABSOLUTE: 0.1 K/UL (ref 0–0.6)
EOSINOPHILS RELATIVE PERCENT: 1.1 %
GFR AFRICAN AMERICAN: >60
GFR NON-AFRICAN AMERICAN: >60
GLUCOSE BLD-MCNC: 91 MG/DL (ref 70–99)
HCT VFR BLD CALC: 41 % (ref 36–48)
HCT VFR BLD CALC: 42.4 % (ref 36–48)
HCT VFR BLD CALC: 43 % (ref 36–48)
HCT VFR BLD CALC: 43.3 % (ref 36–48)
HEMOGLOBIN: 13.7 G/DL (ref 12–16)
HEMOGLOBIN: 14 G/DL (ref 12–16)
HEMOGLOBIN: 14.4 G/DL (ref 12–16)
HEMOGLOBIN: 14.7 G/DL (ref 12–16)
LACTATE DEHYDROGENASE: 189 U/L (ref 100–190)
LYMPHOCYTES ABSOLUTE: 3.5 K/UL (ref 1–5.1)
LYMPHOCYTES RELATIVE PERCENT: 36.9 %
MCH RBC QN AUTO: 30.2 PG (ref 26–34)
MCHC RBC AUTO-ENTMCNC: 33.5 G/DL (ref 31–36)
MCV RBC AUTO: 90 FL (ref 80–100)
MONOCYTES ABSOLUTE: 0.9 K/UL (ref 0–1.3)
MONOCYTES RELATIVE PERCENT: 9.4 %
NEUTROPHILS ABSOLUTE: 4.9 K/UL (ref 1.7–7.7)
NEUTROPHILS RELATIVE PERCENT: 51.9 %
PDW BLD-RTO: 13.4 % (ref 12.4–15.4)
PLATELET # BLD: 198 K/UL (ref 135–450)
PMV BLD AUTO: 8.1 FL (ref 5–10.5)
POTASSIUM SERPL-SCNC: 3.7 MMOL/L (ref 3.5–5.1)
RBC # BLD: 4.55 M/UL (ref 4–5.2)
SODIUM BLD-SCNC: 139 MMOL/L (ref 136–145)
WBC # BLD: 9.4 K/UL (ref 4–11)

## 2021-05-31 PROCEDURE — 85014 HEMATOCRIT: CPT

## 2021-05-31 PROCEDURE — 1200000000 HC SEMI PRIVATE

## 2021-05-31 PROCEDURE — 6370000000 HC RX 637 (ALT 250 FOR IP): Performed by: NURSE PRACTITIONER

## 2021-05-31 PROCEDURE — 83615 LACTATE (LD) (LDH) ENZYME: CPT

## 2021-05-31 PROCEDURE — 87338 HPYLORI STOOL AG IA: CPT

## 2021-05-31 PROCEDURE — 85025 COMPLETE CBC W/AUTO DIFF WBC: CPT

## 2021-05-31 PROCEDURE — 2580000003 HC RX 258: Performed by: FAMILY MEDICINE

## 2021-05-31 PROCEDURE — 85018 HEMOGLOBIN: CPT

## 2021-05-31 PROCEDURE — 80048 BASIC METABOLIC PNL TOTAL CA: CPT

## 2021-05-31 PROCEDURE — 36415 COLL VENOUS BLD VENIPUNCTURE: CPT

## 2021-05-31 RX ADMIN — Medication 10 ML: at 23:23

## 2021-05-31 RX ADMIN — PANTOPRAZOLE SODIUM 40 MG: 40 TABLET, DELAYED RELEASE ORAL at 07:02

## 2021-05-31 RX ADMIN — PANTOPRAZOLE SODIUM 40 MG: 40 TABLET, DELAYED RELEASE ORAL at 15:56

## 2021-05-31 RX ADMIN — Medication 10 ML: at 08:16

## 2021-05-31 ASSESSMENT — PAIN SCALES - GENERAL
PAINLEVEL_OUTOF10: 0

## 2021-05-31 NOTE — PLAN OF CARE
Problem: Falls - Risk of:  Goal: Will remain free from falls  Description: Will remain free from falls  5/31/2021 0814 by Fabiola Araujo RN  Outcome: Ongoing     Problem: Falls - Risk of:  Goal: Absence of physical injury  Description: Absence of physical injury  5/31/2021 0814 by Fabiola Araujo RN  Outcome: Ongoing     Problem: Pain Control  Goal: Maintain pain level at or below patient's acceptable level (or 5 if patient is unable to determine acceptable level)  Outcome: Ongoing     Problem: Pain Control  Goal: Improvement in pain related behaviors BP/HR WNL  Outcome: Ongoing     Problem: Pain:  Goal: Pain level will decrease  Description: Pain level will decrease  Outcome: Ongoing     Problem: Pain:  Goal: Control of acute pain  Description: Control of acute pain  5/31/2021 0814 by Fabiola Araujo RN  Outcome: Ongoing     Problem: Pain:  Goal: Control of chronic pain  Description: Control of chronic pain  Outcome: Ongoing

## 2021-05-31 NOTE — PROGRESS NOTES
[facility]   Progress Note      SUBJECTIVE:      Feels okay. No further chest pain  No fever    OBJECTIVE    Physical  VITALS:  /85   Pulse 61   Temp 98.3 °F (36.8 °C) (Temporal)   Resp 16   Ht 5' 2\" (1.575 m)   Wt 132 lb (59.9 kg)   LMP 2021   SpO2 100%   BMI 24.14 kg/m²   TEMPERATURE:  Current - Temp: 98.3 °F (36.8 °C); Max - Temp  Av.3 °F (36.8 °C)  Min: 97.7 °F (36.5 °C)  Max: 99.1 °F (37.3 °C)  BLOOD PRESSURE RANGE:  Systolic (47FSM), NAF:586 , Min:112 , IZI:541   ; Diastolic (22AGS), DSF:75, Min:70, Max:86    24HR INTAKE/OUTPUT:      Intake/Output Summary (Last 24 hours) at 2021 0644  Last data filed at 2021  Gross per 24 hour   Intake 1305.29 ml   Output --   Net 1305.29 ml       Conscious alert oriented. Appears comfortable. No neck fullness. Respiratory efforts are normal.    Abdomen is not distended. No leg edema    No focal deficits.     Data  Labs:  General Labs:  CBC with Differential:    Lab Results   Component Value Date    WBC 9.4 2021    RBC 4.55 2021    HGB 13.7 2021    HCT 41.0 2021     2021    MCV 90.0 2021    MCH 30.2 2021    MCHC 33.5 2021    RDW 13.4 2021    SEGSPCT 78.7 2012    BANDSPCT 1 2021    LYMPHOPCT 36.9 2021    MONOPCT 9.4 2021    EOSPCT 0.8 2012    BASOPCT 0.7 2021    MONOSABS 0.9 2021    LYMPHSABS 3.5 2021    EOSABS 0.1 2021    BASOSABS 0.1 2021    DIFFTYPE Auto 2012     BMP:    Lab Results   Component Value Date     2021    K 3.7 2021     2021    CO2 22 2021    BUN 10 2021    LABALBU 3.8 2021    CREATININE 0.6 2021    CALCIUM 8.3 2021    GFRAA >60 2021    GFRAA >60 2012    LABGLOM >60 2021    GLUCOSE 91 2021     Hepatic Function Panel:    Lab Results   Component Value Date    ALKPHOS 34 2021    ALT 7 05/29/2021    AST 11 05/29/2021    PROT 6.3 05/29/2021    PROT 7.6 04/14/2012    BILITOT 0.4 05/29/2021    BILIDIR <0.2 05/29/2021    IBILI see below 05/29/2021    LABALBU 3.8 05/29/2021     LDH:    Lab Results   Component Value Date     05/31/2021     PT/INR:    Lab Results   Component Value Date    PROTIME 11.5 05/30/2021    INR 0.99 05/30/2021     PTT:    Lab Results   Component Value Date    APTT 28.0 05/30/2021   [APTT    Current Medications  Current Facility-Administered Medications: pantoprazole (PROTONIX) tablet 40 mg, 40 mg, Oral, BID AC  perflutren lipid microspheres (DEFINITY) injection 1.65 mg, 1.5 mL, Intravenous, ONCE PRN  0.9 % sodium chloride infusion, , Intravenous, PRN  potassium chloride (KLOR-CON M) extended release tablet 40 mEq, 40 mEq, Oral, PRN **OR** potassium bicarb-citric acid (EFFER-K) effervescent tablet 40 mEq, 40 mEq, Oral, PRN **OR** potassium chloride 10 mEq/100 mL IVPB (Peripheral Line), 10 mEq, Intravenous, PRN  sodium chloride flush 0.9 % injection 5-40 mL, 5-40 mL, Intravenous, 2 times per day  sodium chloride flush 0.9 % injection 5-40 mL, 5-40 mL, Intravenous, PRN  0.9 % sodium chloride infusion, 25 mL, Intravenous, PRN  promethazine (PHENERGAN) tablet 12.5 mg, 12.5 mg, Oral, Q6H PRN **OR** ondansetron (ZOFRAN) injection 4 mg, 4 mg, Intravenous, Q6H PRN  polyethylene glycol (GLYCOLAX) packet 17 g, 17 g, Oral, Daily PRN  acetaminophen (TYLENOL) tablet 650 mg, 650 mg, Oral, Q6H PRN **OR** acetaminophen (TYLENOL) suppository 650 mg, 650 mg, Rectal, Q6H PRN  0.9 % sodium chloride infusion, , Intravenous, PRN    ASSESSMENT AND PLAN    18-year-old lady who is otherwise healthy presented to the emergency room on 5/29/2021 with chest pain and was noted to have     1.   Pancytopenia:     -WBC 2.9, ANC 1.9, hemoglobin 4.5, platelet count 95 on admission  -Iron studies-no deficiency  -B12 normal.  -Fibrinogen normal  -LDH slightly elevated at 299, Today 5/31/2021 LDH is normal  -Peripheral smear did not show blasts or any other abnormal blood cells.  -She received 3 units of PRBCs  Today on 5/31/2021, hemoglobin 13.7, WBCs 9.4 and platelet count 054.   -LDH is normal 189     -The exact etiology of transient pancytopenia is unclear     -Okay to discharge from hematology perspective.  -She will need follow-up visit next week to recheck CBC in the office.  -Discussed plan with the patient's family member Kaya Santiago over the phone on 5/30/2021      Ned Tobar MD

## 2021-05-31 NOTE — PROGRESS NOTES
Hospitalist Progress Note      PCP: Jonathon Yu, APRN - CNP    Date of Admission: 5/29/2021    Chief Complaint: Garon Penning chest pain from epigastric up left-sided chest to shoulder    Hospital Course: Angel Monge is a 41 y.o. female with h/o depression and ulcers presents with c/o chest discomfort ongoing for one day. Lab work reveals pancytopenia with severe anemia hb 4.5, Leucopenia WBC 2.9 K  And thrombocytopenia plt count of 95. The pt denies black stool, rectal bleeding, hematuria. Stool is neg for occult blood. Denies heavy periods. Denies h/o anemia or blood transfusion . Last CBC in feb,2020 and prior showed normal hb and platelet count. Was on acid medications for ulcers in the past. Not on any currently. She states she has been under a lot of personal stressor and divorce. She also reports unintentional weight loss of 15 pounds. Family history of heart disease. Denies h/o cancer . Does not take any medications currently. Cardiac work up is non acute troponin < 0.01. Subjective: Pt reports left upper chest tenderness with palpation, but no pain with deep breaths. Reports hx of H. Pylori. Agrees to stress test and echo tomorrow, and to f/u with GI outpatient. Reports a history of gastric ulcers. Denies shortness of breath, palpitations, nausea vomiting, diarrhea dysuria headache lightheadedness or dizziness. Appetite fair. Voiding without difficulty. Reviewed plan of care with patient, denied further needs or questions. Assessment/Plan:    Pancytopenia  -hgb 4.5, white blood cell 2.9, platelet 95 on admission  -5/31/21 hgb 15, white blood cell 11.3, platelet 176 after 3 units of blood  -heme consulted, exact etiology of transient pancytopenia is unclear.   She will need follow-up next week to recheck CBC in the office.  -No tachycardia or shortness of breath on admission  -Chest x-ray nonacute, CT PE study nonacute, CT abdomen nonacute  -Occult stool negative for blood  -INR 0.99    Chest pain  Abnormal EKG  -T wave inversions v1 and v2- new for v2, hx of v1 t wave inversions on ekg in 2018  -troponin < 0.01 x 2  -cardiology consulted, plan for stress test and echo in the morning    History of gastric ulcers  -May be cause of chest pain  -Stop Pepcid  -Continue Protonix to 40 mg p.o. twice daily  -Likely will need GI work-up, as she has a history of gastric ulcers and H. Pylori. This could be done as outpatient  -Stool for H. pylori ordered    Abnormal mammogram  Unintentional 15 pound weight loss  -12/2020 Mammogram: IMPRESSION: Small, faint grouping of calcifications in the lower, outer quadrant of the left breast. In retrospect, calcifications are present in this location dating back to 10/9/2018. Approximately 1.0 cm anterior and lateral to these calcifications there are a few benign calcifications which may represent small, calcified oil cysts.  Six-month follow-up diagnostic mammogram of left breast with repeat magnification views as recommended.   -Patient was instructed have repeat mammogram in 6 months, she needs to have this scheduled  -Patient does report increased stress over the last few months due to divorce      Active Hospital Problems    Diagnosis     Precordial pain [R07.2]      Priority: High    [facility] [D61.818]        Medications:  Reviewed    Infusion Medications    sodium chloride      sodium chloride      sodium chloride       Scheduled Medications    pantoprazole  40 mg Oral BID AC    sodium chloride flush  5-40 mL Intravenous 2 times per day     PRN Meds: perflutren lipid microspheres, sodium chloride, potassium chloride **OR** potassium alternative oral replacement **OR** potassium chloride, sodium chloride flush, sodium chloride, promethazine **OR** ondansetron, polyethylene glycol, acetaminophen **OR** acetaminophen, sodium chloride      Intake/Output Summary (Last 24 hours) at 5/31/2021 1150  Last data filed at 5/31/2021 0800  Gross per 24 hour   Intake 1785.29 ml   Output --   Net 1785.29 ml       Physical Exam Performed:    /83   Pulse 65   Temp 98.1 °F (36.7 °C) (Oral)   Resp 16   Ht 5' 2\" (1.575 m)   Wt 132 lb (59.9 kg)   LMP 04/30/2021   SpO2 98%   BMI 24.14 kg/m²     General appearance: No apparent distress, appears stated age and cooperative. HEENT: Pupils equal, round, and reactive to light. Conjunctivae/corneas clear. Neck: Supple, with full range of motion. No jugular venous distention. Trachea midline. Respiratory:  Normal respiratory effort. Clear to auscultation, bilaterally without Rales/Wheezes/Rhonchi. Cardiovascular: Regular rate and rhythm with normal S1/S2 without murmurs, rubs or gallops. Abdomen: Soft, non-tender, non-distended with normal bowel sounds. Musculoskeletal: No clubbing, cyanosis or edema bilaterally. Full range of motion without deformity. Skin: Skin color, texture, turgor normal.  No rashes or lesions. Neurologic:  Neurovascularly intact without any focal sensory/motor deficits.  Cranial nerves: II-XII intact, grossly non-focal.  Psychiatric: Alert and oriented, thought content appropriate, normal insight  Capillary Refill: Brisk,< 3 seconds   Peripheral Pulses: +2 palpable, equal bilaterally       Labs:   Recent Labs     05/30/21  0423 05/30/21  0901 05/30/21  2217 05/31/21  0401 05/31/21  1042   WBC 11.7* 11.3*  --  9.4  --    HGB 14.3 15.0 13.9 13.7 14.0   HCT 42.7 44.7 41.7 41.0 42.4    212  --  198  --      Recent Labs     05/29/21  1223 05/30/21  0159 05/30/21  0423 05/31/21  0401     --  137 139   K 3.2* 4.6 3.8 3.7     --  105 109   CO2 19*  --  23 22   BUN 9  --  8 10   CREATININE 0.5*  --  0.5* 0.6   CALCIUM 8.0*  --  8.6 8.3     Recent Labs     05/29/21  1223   AST 11*   ALT 7*   BILIDIR <0.2   BILITOT 0.4   ALKPHOS 34*     Recent Labs     05/30/21  0158   INR 0.99     Recent Labs     05/29/21  1223 05/30/21  0423   TROPONINI <0.01 <0.01       Urinalysis:      Lab

## 2021-05-31 NOTE — PLAN OF CARE
Problem: Pain:  Goal: Control of acute pain  Description: Control of acute pain  Outcome: Ongoing  Note: Pt is capable of communicating the presence of pain.  Administer medication as directed and per pt request

## 2021-05-31 NOTE — PROGRESS NOTES
Pts sister at the bedside. Requested an update on plan of care. Pt consented. Updated sister on plan of care. Answered all questions. Denies needs at this time. RN instructed to call for further needs as they arise. Pt and sister satisfied.

## 2021-06-01 VITALS
TEMPERATURE: 97.8 F | BODY MASS INDEX: 24.29 KG/M2 | DIASTOLIC BLOOD PRESSURE: 73 MMHG | OXYGEN SATURATION: 99 % | HEIGHT: 62 IN | HEART RATE: 76 BPM | RESPIRATION RATE: 16 BRPM | WEIGHT: 132 LBS | SYSTOLIC BLOOD PRESSURE: 114 MMHG

## 2021-06-01 LAB
ANION GAP SERPL CALCULATED.3IONS-SCNC: 10 MMOL/L (ref 3–16)
BUN BLDV-MCNC: 10 MG/DL (ref 7–20)
CALCIUM SERPL-MCNC: 8.9 MG/DL (ref 8.3–10.6)
CHLORIDE BLD-SCNC: 107 MMOL/L (ref 99–110)
CO2: 24 MMOL/L (ref 21–32)
CREAT SERPL-MCNC: 0.7 MG/DL (ref 0.6–1.1)
GFR AFRICAN AMERICAN: >60
GFR NON-AFRICAN AMERICAN: >60
GLUCOSE BLD-MCNC: 102 MG/DL (ref 70–99)
HCT VFR BLD CALC: 42.7 % (ref 36–48)
HCT VFR BLD CALC: 46.1 % (ref 36–48)
HEMATOLOGY PATH CONSULT: NORMAL
HEMOGLOBIN: 14.5 G/DL (ref 12–16)
HEMOGLOBIN: 15.5 G/DL (ref 12–16)
LV EF: 65 %
LV EF: 72 %
LVEF MODALITY: NORMAL
LVEF MODALITY: NORMAL
MCH RBC QN AUTO: 30.3 PG (ref 26–34)
MCHC RBC AUTO-ENTMCNC: 34.1 G/DL (ref 31–36)
MCV RBC AUTO: 89 FL (ref 80–100)
PDW BLD-RTO: 13.6 % (ref 12.4–15.4)
PLATELET # BLD: 207 K/UL (ref 135–450)
PMV BLD AUTO: 8.4 FL (ref 5–10.5)
POTASSIUM SERPL-SCNC: 3.8 MMOL/L (ref 3.5–5.1)
RBC # BLD: 4.8 M/UL (ref 4–5.2)
SODIUM BLD-SCNC: 141 MMOL/L (ref 136–145)
WBC # BLD: 8.9 K/UL (ref 4–11)

## 2021-06-01 PROCEDURE — 99232 SBSQ HOSP IP/OBS MODERATE 35: CPT | Performed by: INTERNAL MEDICINE

## 2021-06-01 PROCEDURE — 85018 HEMOGLOBIN: CPT

## 2021-06-01 PROCEDURE — 36415 COLL VENOUS BLD VENIPUNCTURE: CPT

## 2021-06-01 PROCEDURE — 85014 HEMATOCRIT: CPT

## 2021-06-01 PROCEDURE — 80048 BASIC METABOLIC PNL TOTAL CA: CPT

## 2021-06-01 PROCEDURE — 2580000003 HC RX 258: Performed by: FAMILY MEDICINE

## 2021-06-01 PROCEDURE — 6370000000 HC RX 637 (ALT 250 FOR IP): Performed by: NURSE PRACTITIONER

## 2021-06-01 PROCEDURE — 93017 CV STRESS TEST TRACING ONLY: CPT | Performed by: INTERNAL MEDICINE

## 2021-06-01 PROCEDURE — 93306 TTE W/DOPPLER COMPLETE: CPT

## 2021-06-01 PROCEDURE — A9502 TC99M TETROFOSMIN: HCPCS | Performed by: INTERNAL MEDICINE

## 2021-06-01 PROCEDURE — 85027 COMPLETE CBC AUTOMATED: CPT

## 2021-06-01 PROCEDURE — 3430000000 HC RX DIAGNOSTIC RADIOPHARMACEUTICAL: Performed by: INTERNAL MEDICINE

## 2021-06-01 PROCEDURE — 78452 HT MUSCLE IMAGE SPECT MULT: CPT | Performed by: INTERNAL MEDICINE

## 2021-06-01 RX ORDER — PANTOPRAZOLE SODIUM 40 MG/1
40 TABLET, DELAYED RELEASE ORAL
Qty: 60 TABLET | Refills: 0 | Status: CANCELLED | OUTPATIENT
Start: 2021-06-01

## 2021-06-01 RX ORDER — PANTOPRAZOLE SODIUM 40 MG/1
40 TABLET, DELAYED RELEASE ORAL DAILY
Qty: 30 TABLET | Refills: 1 | Status: SHIPPED | OUTPATIENT
Start: 2021-06-01

## 2021-06-01 RX ADMIN — TETROFOSMIN 10 MILLICURIE: 1.38 INJECTION, POWDER, LYOPHILIZED, FOR SOLUTION INTRAVENOUS at 08:25

## 2021-06-01 RX ADMIN — PANTOPRAZOLE SODIUM 40 MG: 40 TABLET, DELAYED RELEASE ORAL at 05:33

## 2021-06-01 RX ADMIN — TETROFOSMIN 30 MILLICURIE: 1.38 INJECTION, POWDER, LYOPHILIZED, FOR SOLUTION INTRAVENOUS at 09:42

## 2021-06-01 RX ADMIN — Medication 10 ML: at 08:05

## 2021-06-01 ASSESSMENT — PAIN SCALES - GENERAL
PAINLEVEL_OUTOF10: 0
PAINLEVEL_OUTOF10: 0

## 2021-06-01 NOTE — PROGRESS NOTES
Patient instructed on Efren Protocol Stress Test Procedure including possible side effects and adverse reactions. Verbalizes knowledge and understanding and denies having any questions.

## 2021-06-01 NOTE — PLAN OF CARE
Problem: Falls - Risk of:  Goal: Will remain free from falls  Description: Will remain free from falls  6/1/2021 1604 by Yulia Tam RN  Outcome: Completed  6/1/2021 0914 by Yulia Tam RN  Outcome: Ongoing  Goal: Absence of physical injury  Description: Absence of physical injury  6/1/2021 1604 by Yulia Tam RN  Outcome: Completed  6/1/2021 0914 by Yulia Tam RN  Outcome: Ongoing     Problem: Pain Control  Goal: Maintain pain level at or below patient's acceptable level (or 5 if patient is unable to determine acceptable level)  6/1/2021 1604 by Yulia Tam RN  Outcome: Completed  6/1/2021 0914 by Yulia Tam RN  Outcome: Ongoing  Goal: Improvement in pain related behaviors BP/HR WNL  6/1/2021 1604 by Yulia Tam RN  Outcome: Completed  6/1/2021 0914 by Yulia Tam RN  Outcome: Ongoing     Problem: Pain:  Goal: Pain level will decrease  Description: Pain level will decrease  6/1/2021 1604 by Yulia Tam RN  Outcome: Completed  6/1/2021 0914 by Yulia Tam RN  Outcome: Ongoing  Goal: Control of acute pain  Description: Control of acute pain  6/1/2021 1604 by Yulia Tam RN  Outcome: Completed  6/1/2021 0914 by Yulia Tam RN  Outcome: Ongoing  Goal: Control of chronic pain  Description: Control of chronic pain  6/1/2021 1604 by Yulia Tam RN  Outcome: Completed  6/1/2021 0914 by Yulia Tam RN  Outcome: Ongoing

## 2021-06-01 NOTE — PROGRESS NOTES
Nutrition Assessment     Type and Reason for Visit: Initial, Positive Nutrition Screen (MST 2 for wt loss)    Nutrition Recommendations/Plan:   No nutrition rec's @ this time. Nutrition Assessment:  Pt is nutritionally stable AEB report of good appetite & no significant wt changes. Pt is eating well with foods brought in from home (dislikes hospital food). Pt expresses no nutrition concerns & is at no risk for nutrition compromise. Malnutrition Assessment:  Malnutrition Status: No malnutrition    Nutrition Related Findings: No edema noted      Current Nutrition Therapies:    DIET GENERAL;     Anthropometric Measures:  · Height: 5' 2\" (157.5 cm)  · Current Body Wt: 132 lb (59.9 kg)   · BMI: 24.1    Nutrition Diagnosis:   No nutrition diagnosis at this time     Nutrition Interventions:   Food and/or Nutrient Delivery:  Continue Current Diet  Nutrition Education/Counseling:  Education not indicated   Coordination of Nutrition Care:  Continue to monitor while inpatient    Goals:  po intake to remain at least 75% of meals       Nutrition Monitoring and Evaluation:   Behavioral-Environmental Outcomes:  None Identified   Food/Nutrient Intake Outcomes:  Food and Nutrient Intake  Physical Signs/Symptoms Outcomes:  None Identified     Discharge Planning:    No discharge needs at this time     Electronically signed by Corrine Edwards RD, LD on 6/1/21 at 2:15 PM EDT    Contact: [phone]

## 2021-06-01 NOTE — PROGRESS NOTES
[address] [facility]  [phone]        Reason for Consultation/Chief Complaint: \" Chest pain. \"    History of Present Illness:  Eugene Mckeon is a 41 y.o. patient who presented to the hospital with complaints of mid-sternal, sharp chest pain. Subjective: no acute events overnight. She reports that she did not have any chest pain when she was exercising today. No shortness of breath or palpitations. Past Medical History:   has a past medical history of Postpartum depression and Ulcer. Surgical History:   has a past surgical history that includes Vagina surgery (3/28/2010). Social History:   reports that she quit smoking about 13 years ago. She smoked 0.50 packs per day. She has never used smokeless tobacco. She reports current alcohol use. She reports that she does not use drugs. Family History:  family history includes Arthritis in her father and mother; Asthma in her father; Cancer in her maternal aunt; Diabetes in her father; High Blood Pressure in her father. Home Medications:  Were reviewed and are listed in nursing record. and/or listed below  Prior to Admission medications    Medication Sig Start Date End Date Taking?  Authorizing Provider   pantoprazole (PROTONIX) 40 MG tablet Take 1 tablet by mouth daily 6/1/21  Yes AMY Lopes CNP   fluticasone (VERAMYST) 27.5 MCG/SPRAY nasal spray 2 sprays by Each Nostril route daily   Yes Historical Provider, MD   cetirizine (ZYRTEC) 10 MG tablet Take 10 mg by mouth daily   Yes Historical Provider, MD   escitalopram (LEXAPRO) 20 MG tablet  2/12/20   Historical Provider, MD   hydrOXYzine (VISTARIL) 25 MG capsule Take 1 capsule by mouth 3 times daily as needed for Anxiety 2/26/20   Fany Stroud PA-C        Allergies:  Ibuprofen and Cephalosporins     Review of Systems:   A complete review of systems has been reviewed and updated today and is negative except as noted in the history of present illness.       Physical Examination:    Vitals:    06/01/21 1108   BP: 114/73   Pulse: 76   Resp: 16   Temp: 97.8 °F (36.6 °C)   SpO2: 99%    Weight: 132 lb (59.9 kg)         General Appearance:  Alert, cooperative, no distress, appears stated age   Head:  Normocephalic, without obvious abnormality, atraumatic   Eyes:  EOMI, conjunctiva/corneas clear       Nose: Nares normal   Throat: Lips normal   Neck: Supple, symmetrical, trachea midline,  no carotid bruit or JVD       Lungs:   Clear to auscultation bilaterally, respirations unlabored   Chest Wall:  No tenderness or deformity   Heart:  Regular rate and rhythm, S1, S2 normal, no murmur, rub or gallop   Abdomen:   Soft, non-tender, bowel sounds active all four quadrants,  no masses, no organomegaly           Extremities: Extremities normal, atraumatic, no cyanosis or edema   Pulses: 2+ and symmetric   Skin: Skin color, texture, turgor normal, no rashes or lesions   Pysch: Normal mood and affect   Neurologic: Normal gross motor and sensory exam.         Labs  CBC:   Lab Results   Component Value Date    WBC 8.9 06/01/2021    RBC 4.80 06/01/2021    HGB 15.5 06/01/2021    HCT 46.1 06/01/2021    MCV 89.0 06/01/2021    RDW 13.6 06/01/2021     06/01/2021     CMP:    Lab Results   Component Value Date     06/01/2021    K 3.8 06/01/2021     06/01/2021    CO2 24 06/01/2021    BUN 10 06/01/2021    CREATININE 0.7 06/01/2021    GFRAA >60 06/01/2021    GFRAA >60 04/14/2012    AGRATIO 1.2 06/25/2017    LABGLOM >60 06/01/2021    GLUCOSE 102 06/01/2021    PROT 6.3 05/29/2021    PROT 7.6 04/14/2012    CALCIUM 8.9 06/01/2021    BILITOT 0.4 05/29/2021    ALKPHOS 34 05/29/2021    AST 11 05/29/2021    ALT 7 05/29/2021     LIPIDS: No components found for: TOTAL CHOLESTEROL,  HDL,  LDL,  TRIGLYCERIDES  PT/INR:  No results found for: PTINR  Lab Results   Component Value Date    TROPONINI <0.01 05/30/2021       EKG:  I have reviewed EKG with the following interpretation:  Impression:    29-MAY-2021 12:20:39 [facility] ROUTINE RECORD  Normal sinus rhythm  Possible Left atrial enlargement  Septal infarct , age undetermined  Abnormal ECG    Imaging/Procedures:   CTPA 5/29/2021:  1. No CT evidence of a pulmonary embolism. 2. No acute intrapulmonary findings. Stress:    Summary    Normal LV size and systolic function.    Left ventricular ejection fraction of 72%.    There is normal isotope uptake at stress and rest.    There is no evidence of myocardial ischemia or scar.       Stress Protocols        Resting ECG    Normal sinus rhythm.        Resting HR:77 bpm       Resting BP:130/86 mmHg        Pre-stress physical exam: Resting pulse ox 98% RA.       Stress Protocol:Exercise - Efren        Peak HR:160 bpm                                  HR/BP product:32284    Peak BP:156/79 mmHg                              Max exercise: 10 METS    Predicted HR: 178 bpm    % of predicted HR: 90    Test duration:9 min    Reason for termination:Physiologic Maximum        ECG Findings    Normal electrocardiographic response to exercise with less than 1.0 mm of    up sloping ST depression.         Echo: Left Ventricle   Normal left ventricle size, wall thickness, and systolic function with an   estimated ejection fraction of 65%. No regional wall motion abnormalities   are seen. Normal diastolic function. AVG. E/e' = 8.4. Mitral Valve   The mitral valve normal in structure and function. No evidence of mitral regurgitation or stenosis. Left Atrium   The left atrium is normal in size. Aortic Valve   The aortic valve is structurally normal. There is no significant aortic   valve regurgitation or stenosis. Aorta   The aortic root is normal in size. Right Ventricle   The right ventricle is normal in size and function. TAPSE is 2.8 cm. Tricuspid Valve   The tricuspid valve is normal in structure and function.  There is no   significant tricuspid valve stenosis. Trivial tricuspid regurgitation. RVSP is 23 mmHg. Right Atrium   The right atrial size is normal.      Pulmonic Valve   The pulmonic valve is not well visualized. There is no evidence of pulmonic valve stenosis. Trivial pulmonic regurgitation present. Pericardial Effusion   No pericardial effusion noted. Pleural Effusion   No pleural effusion. Miscellaneous   The inferior vena cava appears normal in size with normal respiratory   variation. Assessment/Plan:  Principal Problem:    Precordial pain  - stable  - stress test without ischemia and low risk scan  - echocardiogram without significant abnormality   Plan:   - pain control   - return to hospital if worsening symptoms   - follow up with cardiology    Active Problems:    Pancytopenia ([address]  Plan: Per hematology and primary service. Active problems:   Anxiety  Plan: Stable follow up with PCP    Thank you for allowing us to participate in the care of [facility]. Further evaluation will be based upon the patient's clinical course and testing results. All questions and concerns were addressed to the patient/family. Alternatives to my treatment were discussed. The note was completed using EMR. Every effort was made to ensure accuracy; however, inadvertent computerized transcription errors may be present.

## 2021-06-01 NOTE — PLAN OF CARE
Problem: Falls - Risk of:  Goal: Will remain free from falls  Description: Will remain free from falls  Outcome: Ongoing     Problem: Falls - Risk of:  Goal: Absence of physical injury  Description: Absence of physical injury  Outcome: Ongoing     Problem: Pain Control  Goal: Maintain pain level at or below patient's acceptable level (or 5 if patient is unable to determine acceptable level)  6/1/2021 0914 by Lashaun Hanna RN  Outcome: Ongoing     Problem: Pain Control  Goal: Improvement in pain related behaviors BP/HR WNL  Outcome: Ongoing     Problem: Pain:  Goal: Pain level will decrease  Description: Pain level will decrease  Outcome: Ongoing     Problem: Pain:  Goal: Control of acute pain  Description: Control of acute pain  Outcome: Ongoing     Problem: Pain:  Goal: Control of chronic pain  Description: Control of chronic pain  Outcome: Ongoing

## 2021-06-01 NOTE — DISCHARGE SUMMARY
pancytopenia with severe anemia (Hgb 4.5), leukopenia (WBC 2.9), and thrombocytopenia (plt 95). She was transfused. Stool occult blood negative. 1. Pancytopenia. Hgb 4.5, WBC 2.9, plt 95 on admission. Received 3 units PRBCs, counts stable post transfusion. Stool occult blood negative. Cause of transient pancytopenia unclear. Hem/onc on board. Okay for DC, will need f/u next week to recheck CBC in office. 2. Chest pain/abnormal EKG. T wave inversions on EKG are new. Troponin negative x 2. Cardio on board. Stress test negative for ischemia, echo with normal EF and wall motion. 3. Hx gastric ulcers. Suspected possible cause of chest pain. Pepcid transitioned to Protonix. Stool for H.Pylori sent. Recommend GI workup on out patient basis. 4. Recent abnormal mammogram (12/2020). Calcifications outer quadrant left breast (dating back to 10/2018). Reminded patient of need for six month diagnostic mammogram of left breast (due this month).        Consults. IP CONSULT TO HEM/ONC  IP CONSULT TO CARDIOLOGY    Physical examination on discharge day. /73   Pulse 76   Temp 97.8 °F (36.6 °C) (Oral)   Resp 16   Ht 5' 2\" (1.575 m)   Wt 132 lb (59.9 kg)   LMP 04/30/2021   SpO2 99%   BMI 24.14 kg/m²   General appearance. Alert. Looks comfortable. HEENT. Sclera clear. Moist mucus membranes. Cardiovascular. Regular rate and rhythm, normal S1, S2. No murmur. Respiratory. Not using accessory muscles. Clear to auscultation bilaterally, no wheeze. Gastrointestinal. Abdomen soft, non-tender, not distended, normal bowel sounds  Neurology. Facial symmetry. No speech deficits. Moving all extremities equally. Extremities. No edema in lower extremities. Skin. Warm, dry, normal turgor    Condition at time of discharge stable    Medication instructions provided to patient at discharge.      Medication List      START taking these medications    pantoprazole 40 MG tablet  Commonly known as: PROTONIX  Take 1 tablet by mouth daily  Notes to patient: Use: reduces stomach acid, protects the digestive tract  Side effects: headache or diarrhea        CONTINUE taking these medications    cetirizine 10 MG tablet  Commonly known as: ZYRTEC  Notes to patient: Use: to ease allergy signs, treat hives  Side effects: dry mouth, feeling sleepy, weakness     escitalopram 20 MG tablet  Commonly known as: Omayra Russo  Notes to patient: Use: is a SSRI (selective serotonin reuptake inhibitor), used to treat depression and general anxiety disorder (ROSA)  Side effects: Dizziness, drowsiness, sweating, insomnia dry mouth loss of appetite, yawning and upset stomach/indigestion. fluticasone 27.5 MCG/SPRAY nasal spray  Commonly known as: VERAMYST  Notes to patient: Use: to help with signs and symptoms of allergies, stuffy nose  Side effects: Nose bleeds, cough, runny nose, headache, nausea     hydrOXYzine 25 MG capsule  Commonly known as: Vistaril  Take 1 capsule by mouth 3 times daily as needed for Anxiety  Notes to patient: Use: to treat itching, anxiety or mood problems  Side effects: dry mouth, feeling sleepy        STOP taking these medications    ibuprofen 800 MG tablet  Commonly known as: ADVIL;MOTRIN           Where to Get Your Medications      These medications were sent to [address], [address] [facility]  [address], [address] [address]    Phone: [phone]   · pantoprazole 40 MG tablet         Discharge recommendations given to patient. Follow Up. Hem/onc in 1 week for CBC recheck, PCP in 2 weeks  Disposition. home  Activity. activity as tolerated  Diet: DIET GENERAL;      Spent 40 minutes in discharge process.     Signed:  AMY Govea CNP     6/1/2021 2:20 PM

## 2021-06-03 LAB — H PYLORI ANTIGEN STOOL: NEGATIVE

## 2022-05-13 ENCOUNTER — APPOINTMENT (OUTPATIENT)
Dept: GENERAL RADIOLOGY | Age: 42
End: 2022-05-13
Payer: COMMERCIAL

## 2022-05-13 ENCOUNTER — HOSPITAL ENCOUNTER (EMERGENCY)
Age: 42
Discharge: HOME | End: 2022-05-13
Attending: STUDENT IN AN ORGANIZED HEALTH CARE EDUCATION/TRAINING PROGRAM
Payer: COMMERCIAL

## 2022-05-13 VITALS
RESPIRATION RATE: 19 BRPM | BODY MASS INDEX: 23.92 KG/M2 | TEMPERATURE: 98.2 F | HEART RATE: 71 BPM | DIASTOLIC BLOOD PRESSURE: 75 MMHG | SYSTOLIC BLOOD PRESSURE: 110 MMHG | HEIGHT: 62 IN | OXYGEN SATURATION: 99 % | WEIGHT: 130 LBS

## 2022-05-13 DIAGNOSIS — R07.9 CHEST PAIN, UNSPECIFIED TYPE: Primary | ICD-10-CM

## 2022-05-13 LAB
A/G RATIO: 1.6 (ref 1.1–2.2)
ALBUMIN SERPL-MCNC: 4.6 G/DL (ref 3.4–5)
ALP BLD-CCNC: 55 U/L (ref 40–129)
ALT SERPL-CCNC: 7 U/L (ref 10–40)
ANION GAP SERPL CALCULATED.3IONS-SCNC: 14 MMOL/L (ref 3–16)
AST SERPL-CCNC: 12 U/L (ref 15–37)
BASOPHILS ABSOLUTE: 0.1 K/UL (ref 0–0.2)
BASOPHILS RELATIVE PERCENT: 1.1 %
BILIRUB SERPL-MCNC: 0.3 MG/DL (ref 0–1)
BUN BLDV-MCNC: 10 MG/DL (ref 7–20)
CALCIUM SERPL-MCNC: 9.6 MG/DL (ref 8.3–10.6)
CHLORIDE BLD-SCNC: 105 MMOL/L (ref 99–110)
CO2: 20 MMOL/L (ref 21–32)
CREAT SERPL-MCNC: 0.7 MG/DL (ref 0.6–1.1)
EKG ATRIAL RATE: 74 BPM
EKG DIAGNOSIS: NORMAL
EKG P AXIS: 76 DEGREES
EKG P-R INTERVAL: 140 MS
EKG Q-T INTERVAL: 394 MS
EKG QRS DURATION: 72 MS
EKG QTC CALCULATION (BAZETT): 437 MS
EKG R AXIS: 36 DEGREES
EKG T AXIS: 41 DEGREES
EKG VENTRICULAR RATE: 74 BPM
EOSINOPHILS ABSOLUTE: 0.1 K/UL (ref 0–0.6)
EOSINOPHILS RELATIVE PERCENT: 1.2 %
GFR AFRICAN AMERICAN: >60
GFR NON-AFRICAN AMERICAN: >60
GLUCOSE BLD-MCNC: 90 MG/DL (ref 70–99)
HCG QUALITATIVE: NEGATIVE
HCT VFR BLD CALC: 40.4 % (ref 36–48)
HEMOGLOBIN: 13.4 G/DL (ref 12–16)
LIPASE: 19 U/L (ref 13–60)
LYMPHOCYTES ABSOLUTE: 2.8 K/UL (ref 1–5.1)
LYMPHOCYTES RELATIVE PERCENT: 40.2 %
MCH RBC QN AUTO: 29.6 PG (ref 26–34)
MCHC RBC AUTO-ENTMCNC: 33.1 G/DL (ref 31–36)
MCV RBC AUTO: 89.5 FL (ref 80–100)
MONOCYTES ABSOLUTE: 0.7 K/UL (ref 0–1.3)
MONOCYTES RELATIVE PERCENT: 10.3 %
NEUTROPHILS ABSOLUTE: 3.3 K/UL (ref 1.7–7.7)
NEUTROPHILS RELATIVE PERCENT: 47.2 %
PDW BLD-RTO: 13.2 % (ref 12.4–15.4)
PLATELET # BLD: 272 K/UL (ref 135–450)
PMV BLD AUTO: 8.1 FL (ref 5–10.5)
POTASSIUM REFLEX MAGNESIUM: 3.6 MMOL/L (ref 3.5–5.1)
PRO-BNP: 49 PG/ML (ref 0–124)
RBC # BLD: 4.52 M/UL (ref 4–5.2)
SODIUM BLD-SCNC: 139 MMOL/L (ref 136–145)
TOTAL PROTEIN: 7.5 G/DL (ref 6.4–8.2)
TROPONIN: <0.01 NG/ML
WBC # BLD: 7 K/UL (ref 4–11)

## 2022-05-13 PROCEDURE — 71046 X-RAY EXAM CHEST 2 VIEWS: CPT

## 2022-05-13 PROCEDURE — 84484 ASSAY OF TROPONIN QUANT: CPT

## 2022-05-13 PROCEDURE — 83880 ASSAY OF NATRIURETIC PEPTIDE: CPT

## 2022-05-13 PROCEDURE — 93005 ELECTROCARDIOGRAM TRACING: CPT | Performed by: STUDENT IN AN ORGANIZED HEALTH CARE EDUCATION/TRAINING PROGRAM

## 2022-05-13 PROCEDURE — 99285 EMERGENCY DEPT VISIT HI MDM: CPT

## 2022-05-13 PROCEDURE — 83690 ASSAY OF LIPASE: CPT

## 2022-05-13 PROCEDURE — 85025 COMPLETE CBC W/AUTO DIFF WBC: CPT

## 2022-05-13 PROCEDURE — 93010 ELECTROCARDIOGRAM REPORT: CPT | Performed by: INTERNAL MEDICINE

## 2022-05-13 PROCEDURE — 84703 CHORIONIC GONADOTROPIN ASSAY: CPT

## 2022-05-13 PROCEDURE — 80053 COMPREHEN METABOLIC PANEL: CPT

## 2022-05-13 ASSESSMENT — PAIN SCALES - GENERAL: PAINLEVEL_OUTOF10: 0

## 2022-05-13 NOTE — ED PROVIDER NOTES
Primary Care Physician: AMY Spear CNP   Attending Physician: No att. providers found     History   Chief Complaint   Patient presents with    Shortness of Breath     pt to ED via squad, pt c/o shortness of breath onset 15 mins prior to EMS arrival. pt reports a sharp stabbing pain to center/left chest. pt reports intermittent left hand and foot numbness x 3 days.  Chest Pain        HPI   Laura Nicole  is a 35 y.o. female history of depression, chronic chest pain has been evaluated including stress test as well as an echo recently with normal findings who presents brought by EMS complaining of pain on her left side of the chest which is sharp and worse with touch. Stated that pain started 50 minutes before she presented. Also stating that sometimes she has some numbness of her hands and foot that has been going on now for 3 days. Denies any fevers chills nausea vomiting.   No URI symptoms or COVID-like symptoms    Past Medical History:   Diagnosis Date    Postpartum depression     Ulcer         Past Surgical History:   Procedure Laterality Date    VAGINA SURGERY  3/28/2010    repair of vaginal tear        Family History   Problem Relation Age of Onset    Arthritis Mother     Arthritis Father     Asthma Father     Diabetes Father     High Blood Pressure Father     Cancer Maternal Aunt         Social History     Socioeconomic History    Marital status: Single     Spouse name: None    Number of children: None    Years of education: None    Highest education level: None   Occupational History    None   Tobacco Use    Smoking status: Former Smoker     Packs/day: 0.50     Quit date: 2008     Years since quittin.3    Smokeless tobacco: Never Used   Substance and Sexual Activity    Alcohol use: Yes     Comment: 2 GLASSES OF WINE PER DAY    Drug use: No    Sexual activity: Yes   Other Topics Concern    None   Social History Narrative    None     Social Determinants of Health Financial Resource Strain:     Difficulty of Paying Living Expenses: Not on file   Food Insecurity:     Worried About Running Out of Food in the Last Year: Not on file    Zenaida of Food in the Last Year: Not on file   Transportation Needs:     Lack of Transportation (Medical): Not on file    Lack of Transportation (Non-Medical): Not on file   Physical Activity:     Days of Exercise per Week: Not on file    Minutes of Exercise per Session: Not on file   Stress:     Feeling of Stress : Not on file   Social Connections:     Frequency of Communication with Friends and Family: Not on file    Frequency of Social Gatherings with Friends and Family: Not on file    Attends [employer] Services: Not on file    Active Member of [address] [place] [address] or Organizations: Not on file    Attends Club or Organization Meetings: Not on file    Marital Status: Not on file   Intimate Partner Violence:     Fear of Current or Ex-Partner: Not on file    Emotionally Abused: Not on file    Physically Abused: Not on file    Sexually Abused: Not on file   Housing Stability:     Unable to Pay for Housing in the Last Year: Not on file    Number of [place] in the Last Year: Not on file    Unstable Housing in the Last Year: Not on file        Review of Systems   10 total systems reviewed and found to be negative unless otherwise noted in HPI     Physical Exam   /75   Pulse 71   Temp 98.2 °F (36.8 °C)   Resp 19   Ht 5' 2\" (1.575 m)   Wt 130 lb (59 kg)   SpO2 99%   BMI 23.78 kg/m²      CONSTITUTIONAL: Well appearing, in no acute distress   HEAD: atraumatic, normocephalic   EYES: PERRL, No injection, discharge or scleral icterus. ENT: Moist mucous membranes. NECK: Normal ROM, NO LAD   CARDIOVASCULAR: Regular rate and rhythm. No murmurs or gallop. PULMONARY/CHEST: Airway patent. No retractions. Breath sounds clear with good air entry bilaterally.    ABDOMEN: Soft, Non-distended and non-tender, without guarding or rebound. SKIN: Acyanotic, warm, dry   MUSCULOSKELETAL: No swelling, or deformity   NEUROLOGICAL: Awake and oriented x 3. Pulses intact. Grossly nonfocal   Nursing note and vitals reviewed. ED Course & Medical Decision Making   Medications - No data to display   Labs Reviewed   COMPREHENSIVE METABOLIC PANEL W/ REFLEX TO MG FOR LOW K - Abnormal; Notable for the following components:       Result Value    CO2 20 (*)     ALT 7 (*)     AST 12 (*)     All other components within normal limits   HCG, SERUM, QUALITATIVE   CBC WITH AUTO DIFFERENTIAL   BRAIN NATRIURETIC PEPTIDE   TROPONIN   LIPASE      XR CHEST (2 VW)   Final Result   No acute cardiopulmonary disease. EKG INTERPRETATION:  EKG by my preliminary interpretation shows sinus rhythm with rate of 75, normal axis, normal intervals, with no ST changes indicative of ischemia at this time. PROCEDURES:   Procedures    ASSESSMENT AND PLAN:  [id number], Leander Francis is a 35 y.o. female history of depression, chronic chest pain has been evaluated including stress test as well as an echo recently with normal findings who presents brought by EMS complaining of pain on her left side of the chest which is sharp and worse with touch. Stated that pain started 50 minutes before she presented. Also stating that sometimes she has some numbness of her hands and foot that has been going on now for 3 days. On exam she was tender to palpation. The pain was worse with touch on the left side. Pain radiated to her neck when touched. She was evaluated for ACS. EKG with no ischemic changes. Troponin was normal.  X-ray showed no lung disease. The likelihood that this was ACS is low given the fact that she had a negative stress test and an echo recently. At this point I believe that her pain is secondary to costochondritis versus pleurisy.   She is stable no indication for admission discharged home with recommendation to follow-up with her primary care doctor. ClINICAL IMPRESSION:  1. Chest pain, unspecified type          PATIENT REFERRED TO:  AMY Arnold - CNP  [address] [address]  [phone]    Schedule an appointment as soon as possible for a visit in 2 days        DISCHARGE MEDICATIONS:  Discharge Medication List as of 5/13/2022 12:41 PM        DISCONTINUED MEDICATIONS:  Discharge Medication List as of 5/13/2022 12:41 PM        DISPOSITION Decision To Discharge 05/13/2022 12:36:18 PM  -We have instructed the patient, Susan Puri) to return to the ED or call her PCP if her pain/symptoms worsen. -Findings and recommendations explained to patient. She expressed understanding and agreed with the plan.    ___________________________________________________________________________________  _________________________________________________________________________________________  This record is transcribed utilizing voice recognition technology. There are inherent limitations in this technology. In addition, there may be limitations in editing of this report. If there are any discrepancies, please contact me directly.         Arthur Hull MD  05/14/22 4050

## 2022-08-31 ENCOUNTER — HOSPITAL ENCOUNTER (OUTPATIENT)
Age: 42
Discharge: HOME | End: 2022-08-31
Payer: COMMERCIAL

## 2022-08-31 LAB
ALBUMIN SERPL-MCNC: 4.5 G/DL (ref 3.4–5)
ALP BLD-CCNC: 50 U/L (ref 40–129)
ALT SERPL-CCNC: 8 U/L (ref 10–40)
ANION GAP SERPL CALCULATED.3IONS-SCNC: 10 MMOL/L (ref 3–16)
AST SERPL-CCNC: 13 U/L (ref 15–37)
BASOPHILS ABSOLUTE: 0.1 K/UL (ref 0–0.2)
BASOPHILS RELATIVE PERCENT: 1.1 %
BILIRUB SERPL-MCNC: 0.4 MG/DL (ref 0–1)
BILIRUBIN DIRECT: <0.2 MG/DL (ref 0–0.3)
BILIRUBIN, INDIRECT: ABNORMAL MG/DL (ref 0–1)
BUN BLDV-MCNC: 11 MG/DL (ref 7–20)
CALCIUM SERPL-MCNC: 9.2 MG/DL (ref 8.3–10.6)
CHLORIDE BLD-SCNC: 104 MMOL/L (ref 99–110)
CO2: 25 MMOL/L (ref 21–32)
CREAT SERPL-MCNC: 0.7 MG/DL (ref 0.6–1.1)
EOSINOPHILS ABSOLUTE: 0.1 K/UL (ref 0–0.6)
EOSINOPHILS RELATIVE PERCENT: 0.9 %
GFR AFRICAN AMERICAN: >60
GFR NON-AFRICAN AMERICAN: >60
GLUCOSE BLD-MCNC: 127 MG/DL (ref 70–99)
HCT VFR BLD CALC: 36.9 % (ref 36–48)
HEMOGLOBIN: 12.7 G/DL (ref 12–16)
IGA: 145 MG/DL (ref 70–400)
LIPASE: 17 U/L (ref 13–60)
LYMPHOCYTES ABSOLUTE: 2 K/UL (ref 1–5.1)
LYMPHOCYTES RELATIVE PERCENT: 33.4 %
MAGNESIUM: 1.9 MG/DL (ref 1.8–2.4)
MCH RBC QN AUTO: 30.4 PG (ref 26–34)
MCHC RBC AUTO-ENTMCNC: 34.5 G/DL (ref 31–36)
MCV RBC AUTO: 88.2 FL (ref 80–100)
MONOCYTES ABSOLUTE: 0.5 K/UL (ref 0–1.3)
MONOCYTES RELATIVE PERCENT: 7.4 %
NEUTROPHILS ABSOLUTE: 3.5 K/UL (ref 1.7–7.7)
NEUTROPHILS RELATIVE PERCENT: 57.2 %
PDW BLD-RTO: 13.5 % (ref 12.4–15.4)
PLATELET # BLD: 244 K/UL (ref 135–450)
PMV BLD AUTO: 8.7 FL (ref 5–10.5)
POTASSIUM SERPL-SCNC: 3.9 MMOL/L (ref 3.5–5.1)
RBC # BLD: 4.18 M/UL (ref 4–5.2)
SODIUM BLD-SCNC: 139 MMOL/L (ref 136–145)
TOTAL PROTEIN: 7.3 G/DL (ref 6.4–8.2)
TSH SERPL DL<=0.05 MIU/L-ACNC: 0.73 UIU/ML (ref 0.27–4.2)
WBC # BLD: 6.1 K/UL (ref 4–11)

## 2022-08-31 PROCEDURE — 82784 ASSAY IGA/IGD/IGG/IGM EACH: CPT

## 2022-08-31 PROCEDURE — 80048 BASIC METABOLIC PNL TOTAL CA: CPT

## 2022-08-31 PROCEDURE — 83516 IMMUNOASSAY NONANTIBODY: CPT

## 2022-08-31 PROCEDURE — 80076 HEPATIC FUNCTION PANEL: CPT

## 2022-08-31 PROCEDURE — 83735 ASSAY OF MAGNESIUM: CPT

## 2022-08-31 PROCEDURE — 85025 COMPLETE CBC W/AUTO DIFF WBC: CPT

## 2022-08-31 PROCEDURE — 83690 ASSAY OF LIPASE: CPT

## 2022-08-31 PROCEDURE — 84443 ASSAY THYROID STIM HORMONE: CPT

## 2022-08-31 PROCEDURE — 36415 COLL VENOUS BLD VENIPUNCTURE: CPT

## 2022-09-01 LAB — TISSUE TRANSGLUTAMINASE IGA: <0.5 U/ML (ref 0–14)

## 2024-07-29 ENCOUNTER — HOSPITAL ENCOUNTER (EMERGENCY)
Age: 44
Discharge: HOME | End: 2024-07-29
Payer: COMMERCIAL

## 2024-07-29 ENCOUNTER — APPOINTMENT (OUTPATIENT)
Dept: GENERAL RADIOLOGY | Age: 44
End: 2024-07-29
Payer: COMMERCIAL

## 2024-07-29 VITALS
HEART RATE: 76 BPM | BODY MASS INDEX: 22.49 KG/M2 | HEIGHT: 62 IN | TEMPERATURE: 98.5 F | SYSTOLIC BLOOD PRESSURE: 117 MMHG | RESPIRATION RATE: 16 BRPM | OXYGEN SATURATION: 98 % | DIASTOLIC BLOOD PRESSURE: 73 MMHG | WEIGHT: 122.2 LBS

## 2024-07-29 DIAGNOSIS — M79.644 FINGER PAIN, RIGHT: Primary | ICD-10-CM

## 2024-07-29 PROCEDURE — 99283 EMERGENCY DEPT VISIT LOW MDM: CPT

## 2024-07-29 PROCEDURE — 73130 X-RAY EXAM OF HAND: CPT

## 2024-07-29 ASSESSMENT — PAIN SCALES - GENERAL: PAINLEVEL_OUTOF10: 8

## 2024-07-29 ASSESSMENT — LIFESTYLE VARIABLES
HOW OFTEN DO YOU HAVE A DRINK CONTAINING ALCOHOL: NEVER
HOW MANY STANDARD DRINKS CONTAINING ALCOHOL DO YOU HAVE ON A TYPICAL DAY: PATIENT DOES NOT DRINK

## 2024-07-29 ASSESSMENT — PAIN - FUNCTIONAL ASSESSMENT: PAIN_FUNCTIONAL_ASSESSMENT: 0-10

## 2024-07-29 NOTE — ED TRIAGE NOTES
Patient oriented to room and ED throughput process.  Safety measures with ED bed locked in lowest position and call light in reach.  Patient educated on all orders, including any medications.  Patient educated on chief complaint/symptoms. Patient encouraged to ask questions regarding care, medications or treatment plan.  Patient aware of how to reach staff with questions/concerns.   
ignacio L labia majora

## 2024-07-29 NOTE — ED PROVIDER NOTES
(Source):     CC/HPI Summary, DDx, ED Course, and Reassessment:   Geni Jesus is a 44 y.o. female who presents complaining of right second finger pain x 2 months.  Patient reports pain is worse with moving, mostly by playing and when she wakes up in the morning, denies any trauma, prior surgery, weakness, paresthesia, other finger involvement.  Pain does sometimes shoot into the wrist when she bends it.  She came in today because her family was concerned that it has been hurting and she has not gotten checked out.  No prior imaging.  Denies any wounds, fever, edema, rash.    Right hand x-ray, personally reviewed by me and radiology, no acute emergent findings.  No [place] signs, signs of infection on exam.  Neurovascular intact.  Given orthopedic follow-up, instructed follow-up with Ortho, return for any new or worsening symptoms.    Disposition Considerations (tests considered but not done, Admit vs D/C, Shared Decision Making, Pt Expectation of Test or Tx.):        I am the Primary Clinician of Record.  FINAL IMPRESSION      1. Finger pain, right          DISPOSITION/PLAN     DISPOSITION Decision To Discharge 07/29/2024 12:50:42 PM      PATIENT REFERRED TO:  Jovan Light MD  [facility] [place] [address]  [phone]    In 3 days  Orthopedic follow-up.  Return for any new or worsening symptoms.      DISCHARGE MEDICATIONS:  New Prescriptions    No medications on file       DISCONTINUED MEDICATIONS:  Discontinued Medications    No medications on file              (Please note that portions of this note were completed with a voice recognition program.  Efforts were made to edit the dictations but occasionally words are mis-transcribed.)    Bala Wilson PA-C (electronically signed)            Bala Wilson PA-C  07/29/24 0382

## 2024-07-31 NOTE — PROGRESS NOTES
CLINICAL PHARMACY NOTE: MEDS TO BEDS    Total # of Prescriptions Filled: 2   The following medications were delivered to the patient:  FUROSEMIDE 20MG  METOPROLOL TARTRATE 50MG    Additional Documentation:  Delivered to patients room = signed  Ok to be delivered per GRACIE Zuleta - Pharmacy Tech.

## 2024-08-20 ENCOUNTER — OFFICE VISIT (OUTPATIENT)
Dept: ORTHOPEDIC SURGERY | Age: 44
End: 2024-08-20
Payer: COMMERCIAL

## 2024-08-20 VITALS — BODY MASS INDEX: 22.45 KG/M2 | HEIGHT: 62 IN | WEIGHT: 122 LBS

## 2024-08-20 DIAGNOSIS — M19.041 INFLAMMATION OF JOINT OF FINGER OF RIGHT HAND: Primary | ICD-10-CM

## 2024-08-20 PROCEDURE — G8427 DOCREV CUR MEDS BY ELIG CLIN: HCPCS | Performed by: ORTHOPAEDIC SURGERY

## 2024-08-20 PROCEDURE — 99204 OFFICE O/P NEW MOD 45 MIN: CPT | Performed by: ORTHOPAEDIC SURGERY

## 2024-08-20 PROCEDURE — G8420 CALC BMI NORM PARAMETERS: HCPCS | Performed by: ORTHOPAEDIC SURGERY

## 2024-08-20 PROCEDURE — 1036F TOBACCO NON-USER: CPT | Performed by: ORTHOPAEDIC SURGERY

## 2024-08-20 RX ORDER — PREDNISONE 10 MG/1
TABLET ORAL
Qty: 26 TABLET | Refills: 0 | Status: SHIPPED | OUTPATIENT
Start: 2024-08-20

## 2024-08-20 NOTE — PROGRESS NOTES
CHIEF COMPLAINT: Right hand pain/ index finger MCP joint inflammation.      HISTORY:  Ms. Jesus 44 y.o.  female right handed presents today for the first visit for evaluation of a right hand pain which started when she woke up with the pain. She is complaining of index finger pain and swelling. This is better with elevation and worse with ROM. The pain is sharp and not radiating. No other complaint. She was seen at [facility] ER, was evaluated and splinted and asked to see orthopedics. She is unable to take NSAIDs d/t allergy.     Past Medical History:   Diagnosis Date    Postpartum depression     Ulcer        Past Surgical History:   Procedure Laterality Date    VAGINA SURGERY  3/28/2010    repair of vaginal tear       Social History     Socioeconomic History    Marital status: Single     Spouse name: Not on file    Number of children: Not on file    Years of education: Not on file    Highest education level: Not on file   Occupational History    Not on file   Tobacco Use    Smoking status: Former     Current packs/day: 0.00     Types: Cigarettes     Quit date: 2008     Years since quittin.6    Smokeless tobacco: Never   Substance and Sexual Activity    Alcohol use: Yes     Comment: 2 GLASSES OF WINE PER DAY    Drug use: No    Sexual activity: Yes   Other Topics Concern    Not on file   Social History Narrative    Not on file     Social Determinants of Health     Financial Resource Strain: Not on file   Food Insecurity: Not on file   Transportation Needs: Not on file   Physical Activity: Not on file   Stress: Not on file   Social Connections: Not on file   Intimate Partner Violence: Not on file   Housing Stability: Not on file       Family History   Problem Relation Age of Onset    Arthritis Mother     Arthritis Father     Asthma Father     Diabetes Father     High Blood Pressure Father     Cancer Maternal Aunt        Current Outpatient Medications on File Prior to Visit   Medication Sig

## 2024-09-26 ENCOUNTER — OFFICE VISIT (OUTPATIENT)
Dept: ORTHOPEDIC SURGERY | Age: 44
End: 2024-09-26
Payer: COMMERCIAL

## 2024-09-26 VITALS — WEIGHT: 122 LBS | BODY MASS INDEX: 22.45 KG/M2 | HEIGHT: 62 IN

## 2024-09-26 DIAGNOSIS — M19.041 INFLAMMATION OF JOINT OF FINGER OF RIGHT HAND: Primary | ICD-10-CM

## 2024-09-26 PROCEDURE — G8427 DOCREV CUR MEDS BY ELIG CLIN: HCPCS | Performed by: ORTHOPAEDIC SURGERY

## 2024-09-26 PROCEDURE — 1036F TOBACCO NON-USER: CPT | Performed by: ORTHOPAEDIC SURGERY

## 2024-09-26 PROCEDURE — 99214 OFFICE O/P EST MOD 30 MIN: CPT | Performed by: ORTHOPAEDIC SURGERY

## 2024-09-26 PROCEDURE — G8420 CALC BMI NORM PARAMETERS: HCPCS | Performed by: ORTHOPAEDIC SURGERY

## 2024-10-02 ENCOUNTER — TELEPHONE (OUTPATIENT)
Dept: ORTHOPEDIC SURGERY | Age: 44
End: 2024-10-02

## 2024-10-02 NOTE — TELEPHONE ENCOUNTER
regarding imaging MRI right hand  approval and authorization being valid.  Patient was instructed that their imaging needs to be scheduled at [facility]. The patient was instructed to contact the facility to schedule  at:     [facility] [address] [place] [address] P: [phone]     A follow up appointment will need to be scheduled to review the results and treatment plan.

## 2024-10-23 ENCOUNTER — HOSPITAL ENCOUNTER (OUTPATIENT)
Dept: MRI IMAGING | Age: 44
Discharge: HOME | End: 2024-10-23
Attending: ORTHOPAEDIC SURGERY
Payer: COMMERCIAL

## 2024-10-23 DIAGNOSIS — M19.041 INFLAMMATION OF JOINT OF FINGER OF RIGHT HAND: ICD-10-CM

## 2024-10-23 PROCEDURE — 73218 MRI UPPER EXTREMITY W/O DYE: CPT
